# Patient Record
Sex: FEMALE | Race: WHITE | NOT HISPANIC OR LATINO | Employment: OTHER | URBAN - METROPOLITAN AREA
[De-identification: names, ages, dates, MRNs, and addresses within clinical notes are randomized per-mention and may not be internally consistent; named-entity substitution may affect disease eponyms.]

---

## 2022-03-17 ENCOUNTER — TELEPHONE (OUTPATIENT)
Dept: CARDIOLOGY | Facility: CLINIC | Age: 72
End: 2022-03-17
Payer: MEDICARE

## 2022-03-17 NOTE — TELEPHONE ENCOUNTER
----- Message from Carl Flores sent at 3/17/2022 10:43 AM CDT -----  Regarding: Call Back  Who Called: Dr.Pasama Herrera         What is the reason for the call: Dr. Selin herrera is calling in regards to wanting patient to be seen today in regards to a fib. States she was suppose to have surgery today and needed her to follow up with cardiologist. Please contact to further assist.          Can patient be contacted on [employer]: No          Call back number:  [phone]

## 2022-03-18 ENCOUNTER — OFFICE VISIT (OUTPATIENT)
Dept: CARDIOLOGY | Facility: CLINIC | Age: 72
End: 2022-03-18
Payer: MEDICARE

## 2022-03-18 ENCOUNTER — TELEPHONE (OUTPATIENT)
Dept: CARDIOLOGY | Facility: CLINIC | Age: 72
End: 2022-03-18
Payer: MEDICARE

## 2022-03-18 ENCOUNTER — LAB VISIT (OUTPATIENT)
Dept: LAB | Facility: HOSPITAL | Age: 72
End: 2022-03-18
Attending: INTERNAL MEDICINE
Payer: MEDICARE

## 2022-03-18 VITALS
DIASTOLIC BLOOD PRESSURE: 87 MMHG | HEIGHT: 67 IN | HEART RATE: 138 BPM | BODY MASS INDEX: 28.3 KG/M2 | SYSTOLIC BLOOD PRESSURE: 123 MMHG | WEIGHT: 180.31 LBS

## 2022-03-18 DIAGNOSIS — R06.02 SOB (SHORTNESS OF BREATH): ICD-10-CM

## 2022-03-18 DIAGNOSIS — I48.91 ATRIAL FIBRILLATION WITH RVR: Primary | ICD-10-CM

## 2022-03-18 DIAGNOSIS — E66.3 OVERWEIGHT (BMI 25.0-29.9): Chronic | ICD-10-CM

## 2022-03-18 DIAGNOSIS — E78.00 HYPERCHOLESTEROLEMIA: Chronic | ICD-10-CM

## 2022-03-18 DIAGNOSIS — E78.00 HYPERCHOLESTEROLEMIA: ICD-10-CM

## 2022-03-18 DIAGNOSIS — R01.1 UNDIAGNOSED CARDIAC MURMURS: ICD-10-CM

## 2022-03-18 DIAGNOSIS — I48.91 ATRIAL FIBRILLATION WITH RVR: ICD-10-CM

## 2022-03-18 DIAGNOSIS — I48.0 PAROXYSMAL ATRIAL FIBRILLATION: ICD-10-CM

## 2022-03-18 DIAGNOSIS — R53.83 OTHER FATIGUE: Chronic | ICD-10-CM

## 2022-03-18 LAB
CHOLEST SERPL-MCNC: 171 MG/DL (ref 120–199)
CHOLEST/HDLC SERPL: 2.7 {RATIO} (ref 2–5)
HDLC SERPL-MCNC: 63 MG/DL (ref 40–75)
HDLC SERPL: 36.8 % (ref 20–50)
LDLC SERPL CALC-MCNC: 92.6 MG/DL (ref 63–159)
NONHDLC SERPL-MCNC: 108 MG/DL
TRIGL SERPL-MCNC: 77 MG/DL (ref 30–150)
TSH SERPL DL<=0.005 MIU/L-ACNC: 0.83 UIU/ML (ref 0.4–4)

## 2022-03-18 PROCEDURE — 99204 OFFICE O/P NEW MOD 45 MIN: CPT | Mod: S$PBB,,, | Performed by: INTERNAL MEDICINE

## 2022-03-18 PROCEDURE — 84443 ASSAY THYROID STIM HORMONE: CPT | Performed by: INTERNAL MEDICINE

## 2022-03-18 PROCEDURE — 99204 PR OFFICE/OUTPT VISIT, NEW, LEVL IV, 45-59 MIN: ICD-10-PCS | Mod: S$PBB,,, | Performed by: INTERNAL MEDICINE

## 2022-03-18 PROCEDURE — 99213 OFFICE O/P EST LOW 20 MIN: CPT | Mod: PBBFAC,PO | Performed by: INTERNAL MEDICINE

## 2022-03-18 PROCEDURE — 80061 LIPID PANEL: CPT | Performed by: INTERNAL MEDICINE

## 2022-03-18 PROCEDURE — 99999 PR PBB SHADOW E&M-EST. PATIENT-LVL III: ICD-10-PCS | Mod: PBBFAC,,, | Performed by: INTERNAL MEDICINE

## 2022-03-18 PROCEDURE — 36415 COLL VENOUS BLD VENIPUNCTURE: CPT | Mod: PO | Performed by: INTERNAL MEDICINE

## 2022-03-18 PROCEDURE — 99999 PR PBB SHADOW E&M-EST. PATIENT-LVL III: CPT | Mod: PBBFAC,,, | Performed by: INTERNAL MEDICINE

## 2022-03-18 RX ORDER — METOPROLOL SUCCINATE 25 MG/1
25 TABLET, EXTENDED RELEASE ORAL 2 TIMES DAILY
Qty: 60 TABLET | Refills: 2 | Status: SHIPPED | OUTPATIENT
Start: 2022-03-18 | End: 2022-06-15 | Stop reason: DRUGHIGH

## 2022-03-18 RX ORDER — GABAPENTIN 600 MG/1
600 TABLET ORAL 2 TIMES DAILY
COMMUNITY

## 2022-03-18 RX ORDER — ONDANSETRON 4 MG/1
4 TABLET, FILM COATED ORAL EVERY 8 HOURS PRN
COMMUNITY

## 2022-03-18 RX ORDER — TRAMADOL HYDROCHLORIDE 50 MG/1
50 TABLET ORAL EVERY 6 HOURS PRN
COMMUNITY
End: 2022-10-19

## 2022-03-18 RX ORDER — PREDNISONE 5 MG/1
5 TABLET ORAL DAILY PRN
COMMUNITY
End: 2024-02-14 | Stop reason: CLARIF

## 2022-03-18 NOTE — TELEPHONE ENCOUNTER
Cardioversion//tangela 7/14/22 AT 11 AM . Pt vu     Arrive for your procedure at:  [place]      ? FASTING:  You MAY NOT have anything to eat or drink AFTER MIDNIGHT.  If your procedure is scheduled in the afternoon, you may have a LIGHT BREAKFAST BEFORE 6:00 A.M.  For example: Two slices of toast; black coffee or black tea.    ? MEDICATIONS:  You may take your regular morning medications with a small sip of water.     Hold or adjust the following:   Fluid pills.   Diabetes medications.    Continue: Coumadin, Plavix, Effient, Aspirin, Anti-coagulants, Blood thinners    Please refer to pre-op instructions received from [place].

## 2022-03-18 NOTE — PATIENT INSTRUCTIONS
Cardioversion// GEORGE  4/7/22 AT 10 AM     Arrive for your procedure at:  [facility]      FASTING:  You MAY NOT have anything to eat or drink AFTER MIDNIGHT.  If your procedure is scheduled in the afternoon, you may have a LIGHT BREAKFAST BEFORE 6:00 A.M.  For example: Two slices of toast; black coffee or black tea.    MEDICATIONS:  You may take your regular morning medications with a small sip of water.     Hold or adjust the following:  Fluid pills.  Diabetes medications.    Continue: Coumadin, Plavix, Effient, Aspirin, Anti-coagulants, Blood thinners    Please refer to pre-op instructions received from [facility].

## 2022-03-18 NOTE — PROGRESS NOTES
Subjective:    Patient ID:  Veronica Monroy is a 72 y.o. female who presents for Atrial Fibrillation        Palpitations   This is a new problem. The current episode started more than 1 month ago. The problem has been gradually worsening. Associated symptoms include an irregular heartbeat, malaise/fatigue and shortness of breath. Pertinent negatives include no anxiety, chest pain, coughing, diaphoresis, dizziness, fever, nausea, near-syncope, numbness, syncope, vomiting or weakness.   Shortness of Breath  This is a new problem. The current episode started more than 1 month ago. The problem has been gradually worsening. Pertinent negatives include no abdominal pain, chest pain, claudication, fever, hemoptysis, leg swelling, orthopnea, PND, sputum production, syncope, vomiting or wheezing. The symptoms are aggravated by exercise. She has tried rest for the symptoms.       NP EVALUATION, A FIB NOTED ON PRE-OP EKG FOR SHOULDER SURGERY, UNKNOWN ONSET, NOW SINCE SHE KNOWS, HAD INTERMITTENT SX AND SOB, FATIGUE,  PALP, PCP STARTED XARETO DID NOT START YET,HB 11.0, CMP NORMAL,  SX FOR FEW MONTHS, SEE ROS  Past Medical History:   Diagnosis Date    Hyperlipidemia      Past Surgical History:   Procedure Laterality Date    APPENDECTOMY      CATARACT      HYSTERECTOMY       Family History   Problem Relation Age of Onset    Heart disease Mother     Arrhythmia Mother      Social History     Socioeconomic History    Marital status:    Tobacco Use    Smoking status: Former Smoker     Start date: 3/18/2010     Quit date: 3/18/2016     Years since quittin.0    Smokeless tobacco: Never Used   Substance and Sexual Activity    Alcohol use: Not Currently    Drug use: Never    Sexual activity: Not Currently       Review of patient's allergies indicates:  No Known Allergies    Current Outpatient Medications:     gabapentin (NEURONTIN) 600 MG tablet, Take 600 mg by mouth., Disp: , Rfl:     ondansetron (ZOFRAN) 4 MG  tablet, Take 4 mg by mouth every 8 (eight) hours as needed., Disp: , Rfl:     predniSONE (DELTASONE) 5 MG tablet, Take 5 mg by mouth daily as needed., Disp: , Rfl:     traMADoL (ULTRAM) 50 mg tablet, Take 50 mg by mouth every 6 (six) hours as needed., Disp: , Rfl:     metoprolol succinate (TOPROL-XL) 25 MG 24 hr tablet, Take 1 tablet (25 mg total) by mouth 2 (two) times daily., Disp: 60 tablet, Rfl: 2    rivaroxaban (XARELTO) 20 mg Tab, Take 1 tablet (20 mg total) by mouth daily with dinner or evening meal., Disp: 30 tablet, Rfl: 2    Review of Systems   Constitutional: Positive for malaise/fatigue. Negative for chills, diaphoresis, fever and night sweats.   HENT: Negative for congestion and nosebleeds.    Eyes: Negative for blurred vision and visual disturbance.   Cardiovascular: Positive for dyspnea on exertion, irregular heartbeat and palpitations. Negative for chest pain, claudication, cyanosis, leg swelling, near-syncope, orthopnea, paroxysmal nocturnal dyspnea and syncope.   Respiratory: Positive for shortness of breath and sleep disturbances due to breathing. Negative for cough, hemoptysis, sputum production and wheezing.    Endocrine: Negative for cold intolerance, heat intolerance and polyuria.   Hematologic/Lymphatic: Negative for adenopathy. Does not bruise/bleed easily.   Skin: Negative for color change, itching and nail changes.   Musculoskeletal: Negative for back pain (SCIATICA), falls (DECEMBER , TOR ROTATOR CUFF) and joint pain (L SHOULDER).   Gastrointestinal: Negative for bloating, abdominal pain, change in bowel habit, constipation, dysphagia, heartburn, hematemesis, jaundice, melena, nausea and vomiting.   Genitourinary: Negative for dysuria, flank pain, frequency and hematuria.   Neurological: Negative for brief paralysis, dizziness, focal weakness, light-headedness, loss of balance, numbness, paresthesias, seizures, sensory change, tremors and weakness.   Psychiatric/Behavioral: Negative  "for altered mental status, depression, memory loss and substance abuse. The patient is not nervous/anxious.    Allergic/Immunologic: Negative for environmental allergies and persistent infections.        Objective:      Vitals:    03/18/22 0830   BP: 123/87   Pulse: (!) 138   Weight: 81.8 kg (180 lb 5.4 oz)   Height: 5' 6.5" (1.689 m)   PainSc: 0-No pain     Body mass index is 28.67 kg/m².    Physical Exam  Constitutional:       Appearance: She is well-developed.   HENT:      Head: Normocephalic and atraumatic.   Eyes:      Conjunctiva/sclera: Conjunctivae normal.      Pupils: Pupils are equal, round, and reactive to light.   Neck:      Thyroid: No thyromegaly.      Vascular: Normal carotid pulses. No carotid bruit, hepatojugular reflux or JVD.      Trachea: Trachea normal. No tracheal deviation.   Cardiovascular:      Rate and Rhythm: Tachycardia present. Rhythm irregular.  No extrasystoles are present.     Chest Wall: PMI is not displaced.      Pulses:           Carotid pulses are 2+ on the right side and 2+ on the left side.       Radial pulses are 2+ on the right side and 2+ on the left side.        Femoral pulses are 2+ on the right side and 2+ on the left side.       Dorsalis pedis pulses are 2+ on the right side and 2+ on the left side.        Posterior tibial pulses are 2+ on the right side and 2+ on the left side.      Heart sounds: Murmur heard.    Systolic murmur is present with a grade of 2/6 at the lower left sternal border.    No friction rub. No gallop.   Pulmonary:      Effort: Pulmonary effort is normal. No tachypnea or bradypnea.      Breath sounds: Normal breath sounds. No wheezing or rales.   Chest:      Chest wall: No tenderness.   Abdominal:      General: Bowel sounds are normal. There is no distension.      Palpations: Abdomen is soft. There is no mass.      Tenderness: There is no abdominal tenderness. There is no guarding.   Musculoskeletal:         General: Normal range of motion.      " Cervical back: Normal range of motion and neck supple. No edema or erythema.      Right lower leg: No edema.      Left lower leg: No edema.   Lymphadenopathy:      Cervical: No cervical adenopathy.   Skin:     General: Skin is warm and dry.      Coloration: Skin is not pale.      Findings: No erythema or rash.   Neurological:      Mental Status: She is alert and oriented to person, place, and time.      Cranial Nerves: No cranial nerve deficit.      Motor: No tremor or abnormal muscle tone.      Coordination: Coordination normal.   Psychiatric:         Speech: Speech normal.         Behavior: Behavior normal.         Thought Content: Thought content normal.         Judgment: Judgment normal.                 ..    Chemistry    No results found for: NA, K, CL, CO2, BUN, CREATININE, GLU No results found for: CALCIUM, ALKPHOS, AST, ALT, BILITOT, ESTGFRAFRICA, EGFRNONAA         ..  Lab Results   Component Value Date    CHOL 171 03/18/2022     Lab Results   Component Value Date    HDL 63 03/18/2022     Lab Results   Component Value Date    LDLCALC 92.6 03/18/2022     Lab Results   Component Value Date    TRIG 77 03/18/2022     Lab Results   Component Value Date    CHOLHDL 36.8 03/18/2022     ..No results found for: WBC, HGB, HCT, MCV, PLT    Test(s) Reviewed  I have reviewed the following in detail:  [] Stress test   [] Angiography   [] Echocardiogram   [x] Labs     [x] Other:       Assessment:         ICD-10-CM ICD-9-CM   1. Atrial fibrillation with RVR  I48.91 427.31   2. SOB (shortness of breath)  R06.02 786.05   3. Undiagnosed cardiac murmurs  R01.1 785.2   4. Other fatigue  R53.83 780.79   5. Hypercholesterolemia  E78.00 272.0   6. Overweight (BMI 25.0-29.9)  E66.3 278.02     Problem List Items Addressed This Visit        Cardiac/Vascular    Atrial fibrillation with RVR - Primary    Relevant Orders    TSH (Completed)    Echo    Nuclear Stress - Cardiology Interpreted    Undiagnosed cardiac murmurs    Relevant Orders     Echo    Hypercholesterolemia    Relevant Orders    Lipid Panel (Completed)       Endocrine    Overweight (BMI 25.0-29.9)       Other    SOB (shortness of breath)    Relevant Orders    TSH (Completed)    Echo    Nuclear Stress - Cardiology Interpreted    Other fatigue           Plan:     EKG A FIB WITH RVR NS T CHANGES, ONSET PROBABLY IN THE LAST FEW MONTHS NO DEFINITE SYMPTOMS HOWEVER THE PATIENT STATES THAT SHE HAS BEEN TIRED AND SHORT OF BREATH IN THE LAST FEW MONTHS PROBABLY SINCE DECEMBER , ADD XARELTO AND BB, WILL NEED FURTHER ASSESSMENT CHECK ECHO, NUCLEAR STRESS TEST AND, LABS, THEN GEORGE/ CARDIOVERSION, IN FEW WEEKS AFTER BEING ANTICOAGULATED, ASSESS ANGINA EQUIVALENT NO OVERT HEART FAILURE NEW ONSET ARRHYTHMIA, NO TIA TYPE SYMPTOMS NO SYNCOPE, DIET WEIGHT LOSS, RETURN TO CLINIC IN FEW WEEKS.      Atrial fibrillation with RVR  Comments:  NEW PROBABLY IN THE LAST FEW MONTHS  Orders:  -     TSH; Future; Expected date: 03/18/2022  -     Echo  -     Nuclear Stress - Cardiology Interpreted; Future    SOB (shortness of breath)  Comments:  WITH AFIB, EVALUATE FURTHER  Orders:  -     TSH; Future; Expected date: 03/18/2022  -     Echo  -     Nuclear Stress - Cardiology Interpreted; Future    Undiagnosed cardiac murmurs  Comments:  ECHO  Orders:  -     Echo    Other fatigue    Hypercholesterolemia  Comments:  CONSIDER STATIN  Orders:  -     Lipid Panel; Future; Expected date: 03/18/2022    Overweight (BMI 25.0-29.9)  Comments:  DIET    Other orders  -     rivaroxaban (XARELTO) 20 mg Tab; Take 1 tablet (20 mg total) by mouth daily with dinner or evening meal.  Dispense: 30 tablet; Refill: 2  -     metoprolol succinate (TOPROL-XL) 25 MG 24 hr tablet; Take 1 tablet (25 mg total) by mouth 2 (two) times daily.  Dispense: 60 tablet; Refill: 2    RTC Low level/low impact aerobic exercise 5x's/wk. Heart healthy diet and risk factor modification.    See labs and med orders.    Aerobic exercise 5x's/wk. Heart healthy diet and  risk factor modification.    See labs and med orders.

## 2022-03-28 ENCOUNTER — HOSPITAL ENCOUNTER (OUTPATIENT)
Dept: RADIOLOGY | Facility: HOSPITAL | Age: 72
Discharge: HOME | End: 2022-03-28
Attending: INTERNAL MEDICINE
Payer: MEDICARE

## 2022-03-28 ENCOUNTER — CLINICAL SUPPORT (OUTPATIENT)
Dept: CARDIOLOGY | Facility: HOSPITAL | Age: 72
End: 2022-03-28
Attending: INTERNAL MEDICINE
Payer: MEDICARE

## 2022-03-28 VITALS — WEIGHT: 180 LBS | BODY MASS INDEX: 28.93 KG/M2 | HEIGHT: 66 IN

## 2022-03-28 DIAGNOSIS — R06.02 SOB (SHORTNESS OF BREATH): ICD-10-CM

## 2022-03-28 DIAGNOSIS — I48.91 ATRIAL FIBRILLATION WITH RVR: ICD-10-CM

## 2022-03-28 LAB
CV PHARM DOSE: 0.4 MG
CV STRESS BASE HR: 114 BPM
DIASTOLIC BLOOD PRESSURE: 75 MMHG
NUC REST EJECTION FRACTION: 33
NUC STRESS EJECTION FRACTION: 36 %
OHS CV CPX 1 MINUTE RECOVERY HEART RATE: 116 BPM
OHS CV CPX 85 PERCENT MAX PREDICTED HEART RATE MALE: 123
OHS CV CPX MAX PREDICTED HEART RATE: 144
OHS CV CPX PATIENT IS FEMALE: 1
OHS CV CPX PATIENT IS MALE: 0
OHS CV CPX PEAK DIASTOLIC BLOOD PRESSURE: 75 MMHG
OHS CV CPX PEAK HEAR RATE: 160 BPM
OHS CV CPX PEAK RATE PRESSURE PRODUCT: NORMAL
OHS CV CPX PEAK SYSTOLIC BLOOD PRESSURE: 113 MMHG
OHS CV CPX PERCENT MAX PREDICTED HEART RATE ACHIEVED: 111
OHS CV CPX RATE PRESSURE PRODUCT PRESENTING: NORMAL
OHS CV PHARM TIME: 1411 MIN
SYSTOLIC BLOOD PRESSURE: 113 MMHG

## 2022-03-28 PROCEDURE — 93016 CV STRESS TEST SUPVJ ONLY: CPT | Mod: ,,, | Performed by: INTERNAL MEDICINE

## 2022-03-28 PROCEDURE — 78452 STRESS TEST WITH MYOCARDIAL PERFUSION (CUPID ONLY): ICD-10-PCS | Mod: 26,,, | Performed by: INTERNAL MEDICINE

## 2022-03-28 PROCEDURE — 93017 CV STRESS TEST TRACING ONLY: CPT | Mod: PO

## 2022-03-28 PROCEDURE — A9502 TC99M TETROFOSMIN: HCPCS | Mod: PO

## 2022-03-28 PROCEDURE — 78452 HT MUSCLE IMAGE SPECT MULT: CPT | Mod: 26,,, | Performed by: INTERNAL MEDICINE

## 2022-03-28 PROCEDURE — 93018 CV STRESS TEST I&R ONLY: CPT | Mod: ,,, | Performed by: INTERNAL MEDICINE

## 2022-03-28 PROCEDURE — 93016 STRESS TEST WITH MYOCARDIAL PERFUSION (CUPID ONLY): ICD-10-PCS | Mod: ,,, | Performed by: INTERNAL MEDICINE

## 2022-03-28 PROCEDURE — 63600175 PHARM REV CODE 636 W HCPCS: Mod: PO | Performed by: INTERNAL MEDICINE

## 2022-03-28 PROCEDURE — 93018 PR CARDIAC STRESS TST,INTERP/REPT ONLY: ICD-10-PCS | Mod: ,,, | Performed by: INTERNAL MEDICINE

## 2022-03-28 RX ORDER — AMINOPHYLLINE 25 MG/ML
75 INJECTION, SOLUTION INTRAVENOUS
Status: COMPLETED | OUTPATIENT
Start: 2022-03-28 | End: 2022-03-28

## 2022-03-28 RX ORDER — REGADENOSON 0.08 MG/ML
0.4 INJECTION, SOLUTION INTRAVENOUS ONCE
Status: COMPLETED | OUTPATIENT
Start: 2022-03-28 | End: 2022-03-28

## 2022-03-28 RX ADMIN — REGADENOSON 0.4 MG: 0.08 INJECTION, SOLUTION INTRAVENOUS at 02:03

## 2022-03-28 RX ADMIN — AMINOPHYLLINE 75 MG: 25 INJECTION, SOLUTION INTRAVENOUS at 02:03

## 2022-04-06 ENCOUNTER — CLINICAL SUPPORT (OUTPATIENT)
Dept: CARDIOLOGY | Facility: HOSPITAL | Age: 72
End: 2022-04-06
Attending: INTERNAL MEDICINE
Payer: MEDICARE

## 2022-04-06 VITALS — HEART RATE: 88 BPM | HEIGHT: 66 IN | BODY MASS INDEX: 28.93 KG/M2 | WEIGHT: 180 LBS

## 2022-04-06 PROCEDURE — 93306 TTE W/DOPPLER COMPLETE: CPT | Mod: PO

## 2022-04-06 PROCEDURE — 93306 ECHO (CUPID ONLY): ICD-10-PCS | Mod: 26,,, | Performed by: INTERNAL MEDICINE

## 2022-04-06 PROCEDURE — 93306 TTE W/DOPPLER COMPLETE: CPT | Mod: 26,,, | Performed by: INTERNAL MEDICINE

## 2022-04-07 LAB
ASCENDING AORTA: 2.1 CM
AV INDEX (PROSTH): 0.69
AV MEAN GRADIENT: 4 MMHG
AV PEAK GRADIENT: 7 MMHG
AV VALVE AREA: 2.1 CM2
AV VELOCITY RATIO: 0.75
BSA FOR ECHO PROCEDURE: 1.95 M2
CV ECHO LV RWT: 0.26 CM
DOP CALC AO PEAK VEL: 1.3 M/S
DOP CALC AO VTI: 26.61 CM
DOP CALC LVOT AREA: 3 CM2
DOP CALC LVOT DIAMETER: 1.97 CM
DOP CALC LVOT PEAK VEL: 0.97 M/S
DOP CALC LVOT STROKE VOLUME: 55.84 CM3
DOP CALCLVOT PEAK VEL VTI: 18.33 CM
ECHO LV POSTERIOR WALL: 0.77 CM (ref 0.6–1.1)
EJECTION FRACTION: 40 %
FRACTIONAL SHORTENING: 23 % (ref 28–44)
INTERVENTRICULAR SEPTUM: 1.02 CM (ref 0.6–1.1)
IVRT: 154.14 MSEC
LA MAJOR: 6.86 CM
LA MINOR: 6.85 CM
LA WIDTH: 6.01 CM
LEFT ATRIUM SIZE: 4.67 CM
LEFT ATRIUM VOLUME INDEX: 85.6 ML/M2
LEFT ATRIUM VOLUME: 163.54 CM3
LEFT INTERNAL DIMENSION IN SYSTOLE: 4.51 CM (ref 2.1–4)
LEFT VENTRICLE DIASTOLIC VOLUME INDEX: 88.97 ML/M2
LEFT VENTRICLE DIASTOLIC VOLUME: 169.94 ML
LEFT VENTRICLE MASS INDEX: 107 G/M2
LEFT VENTRICLE SYSTOLIC VOLUME INDEX: 48.7 ML/M2
LEFT VENTRICLE SYSTOLIC VOLUME: 93.01 ML
LEFT VENTRICULAR INTERNAL DIMENSION IN DIASTOLE: 5.85 CM (ref 3.5–6)
LEFT VENTRICULAR MASS: 205.06 G
PISA MRMAX VEL: 0.05 M/S
PISA TR MAX VEL: 3.16 M/S
RA MAJOR: 6.16 CM
RA PRESSURE: 8 MMHG
RA WIDTH: 3.43 CM
RIGHT VENTRICULAR END-DIASTOLIC DIMENSION: 3.53 CM
RV TISSUE DOPPLER FREE WALL SYSTOLIC VELOCITY 1 (APICAL 4 CHAMBER VIEW): 20.01 CM/S
SINUS: 2.87 CM
STJ: 2.27 CM
TR MAX PG: 40 MMHG
TRICUSPID ANNULAR PLANE SYSTOLIC EXCURSION: 2.11 CM
TV REST PULMONARY ARTERY PRESSURE: 48 MMHG

## 2022-04-08 ENCOUNTER — TELEPHONE (OUTPATIENT)
Dept: CARDIOLOGY | Facility: CLINIC | Age: 72
End: 2022-04-08
Payer: MEDICARE

## 2022-04-08 DIAGNOSIS — R94.39 ABNORMAL STRESS TEST: Primary | ICD-10-CM

## 2022-04-11 ENCOUNTER — TELEPHONE (OUTPATIENT)
Dept: CARDIOLOGY | Facility: CLINIC | Age: 72
End: 2022-04-11
Payer: MEDICARE

## 2022-04-11 NOTE — TELEPHONE ENCOUNTER
----- Message from Fouzia Fitzgerald sent at 4/11/2022  8:31 AM CDT -----  Regarding: pt called  Name of Who is Calling: KINGA MIRANDA [[id number]]      What is the request in detail:Pt would like to speak with the Nurse to confirm information about her upcoming appt and procedure. Please advise        Can the clinic reply by [facility]: No      What [facility] to [facility] Back [facility]: [phone]

## 2022-04-11 NOTE — TELEPHONE ENCOUNTER
Spoke to pt over the phone , pt wanted to confirm procedure on 4/14/22 Alexandre w cardioversion. Pt VU

## 2022-04-12 ENCOUNTER — TELEPHONE (OUTPATIENT)
Dept: CARDIOLOGY | Facility: CLINIC | Age: 72
End: 2022-04-12
Payer: MEDICARE

## 2022-04-12 NOTE — TELEPHONE ENCOUNTER
----- Message from Raquel Kumar sent at 4/12/2022 10:25 AM CDT -----  Contact: Pt  Type: Needs Medical Advice    Who Called:  Pt    Medication: rivaroxaban (XARELTO) 20 mg Tab    Best Call Back Number: [phone]    Additional Information: Pt states she is going to get above Rx from [facility], but it won't get to her for 4-5 days.  She wants to know if it's OK to go without it for that long, or if a few pills can be prescribed.  Please call back.  Thanks.

## 2022-04-12 NOTE — TELEPHONE ENCOUNTER
----- Message from Aparna Arteaga sent at 4/12/2022 10:47 AM CDT -----  Contact: patient  Type: Needs Medical Advice  Who Called:  patient   Medication: rivaroxaban (XARELTO) 20 mg Tab  Pharmacy name and phone #:    [id number] - [address] [address]  Phone: [phone] Fax: [phone]  [facility] Call Back Number:[phone] (home)   Additional Information: patient only has one pill left and her meds wont be there until Friday or Saturday- she wants to know if there is something she can take for the next could of days until it comes in or can you send her something (an alternative that is cost effective) to the pharmacy for a few days.

## 2022-04-19 DIAGNOSIS — R94.39 ABNORMAL STRESS TEST: Primary | ICD-10-CM

## 2022-04-21 ENCOUNTER — TELEPHONE (OUTPATIENT)
Dept: CARDIOLOGY | Facility: CLINIC | Age: 72
End: 2022-04-21
Payer: MEDICARE

## 2022-04-21 NOTE — TELEPHONE ENCOUNTER
----- Message from Yon Palma MD sent at 4/8/2022  3:41 PM CDT -----  Abnormal stress test cardiac catheterization 4 weeks after her scheduled cardioversion

## 2022-04-21 NOTE — TELEPHONE ENCOUNTER
Angiogram  5/16/22 at 8 am   Arrive for procedure at: [facility]    You will receive a phone call from [facility] Pre-Op Department with further instructions prior to your scheduled procedure.    Notify the nurse if you are ALLERGIC TO IODINE.    FASTING: You MAY NOT have anything to eat or drink AFTER MIDNIGHT the day before your procedure. If your procedure is scheduled in the afternoon, you may have a LIGHT BREAKFAST 6-8 hours prior to your procedure.  For example: Two slices of toast; black coffee or black tea.    MEDICATIONS: You may take your regular morning medications with water. If there are any medications that you should not take, you will be instructed to hold them for that morning.    ? CARDIOLOGY PRE-PROCEDURE MEDICATION ORDERS:  ** Please hold any medications that are checked below:    HOLD   # OF DAYS TO HOLD  ? Coumadin   Consult with Coumadin Clinic   ? Xarelto    _DAY BEFORE & DAY OF_  ? Pradaxa  _ DAY BEFORE & DAY OF _  ? Eliquis   _ DAY BEFORE & DAY OF _  ? Metformin    Day before procedure & morning of procedure  ? Short acting insulin   Morning of procedure    CONTINUE the Following Medications   ? Plavix      ? Effient     ? Aspirin    WHAT TO EXPECT:    How long will the procedure take?  The procedure will take an average of 1 - 2 hours to perform.  After the procedure, you will need to lay flat for around 4 - 6 hours to minimize bleeding from the puncture site. If the wrist is accessed you will need to keep your arm still as instructed by the nurse.    When can I go home?  You may be able to be discharged home that same afternoon if there were no complications.  If you have one of the following: balloon; stent; pacemaker or defibrillator procedures, you may spend one night for observation.  Your doctor will determine your discharge based upon your progress.  The results of your procedure will be discussed with you before you are discharged.  Any further testing or procedures  will be scheduled for you either before you leave or you will be instructed to call for a future appointment.      TRANSPORTATION:  PLEASE ARRANGE TO HAVE SOMEONE DRIVE YOU HOME FOLLOWING YOUR PROCEDURE, YOU WILL NOT BE ALLOWED TO DRIVE.

## 2022-04-21 NOTE — TELEPHONE ENCOUNTER
----- Message from Delmis Walsh sent at 4/21/2022 12:18 PM CDT -----  Contact: self  Type:  Patient Returning Call    Who Called:  patient  Who Left Message for Patient:  Roberto  Does the patient know what this is regarding?:  [facility]   Best Call Back Number:  [phone] (home)   Additional Information:  Thanks she will be waiting for your call

## 2022-04-27 ENCOUNTER — OFFICE VISIT (OUTPATIENT)
Dept: CARDIOLOGY | Facility: CLINIC | Age: 72
End: 2022-04-27
Payer: MEDICARE

## 2022-04-27 VITALS
OXYGEN SATURATION: 98 % | HEIGHT: 66 IN | HEART RATE: 75 BPM | WEIGHT: 175.69 LBS | DIASTOLIC BLOOD PRESSURE: 58 MMHG | SYSTOLIC BLOOD PRESSURE: 123 MMHG | BODY MASS INDEX: 28.23 KG/M2

## 2022-04-27 DIAGNOSIS — I42.0 DILATED CARDIOMYOPATHY: ICD-10-CM

## 2022-04-27 DIAGNOSIS — Z79.01 LONG TERM (CURRENT) USE OF ANTICOAGULANTS: Chronic | ICD-10-CM

## 2022-04-27 DIAGNOSIS — I34.0 NONRHEUMATIC MITRAL VALVE REGURGITATION: ICD-10-CM

## 2022-04-27 DIAGNOSIS — E66.3 OVERWEIGHT (BMI 25.0-29.9): Chronic | ICD-10-CM

## 2022-04-27 DIAGNOSIS — R06.02 SOB (SHORTNESS OF BREATH): ICD-10-CM

## 2022-04-27 DIAGNOSIS — R94.39 ABNORMAL NUCLEAR STRESS TEST: Primary | ICD-10-CM

## 2022-04-27 DIAGNOSIS — I48.0 PAF (PAROXYSMAL ATRIAL FIBRILLATION): Chronic | ICD-10-CM

## 2022-04-27 PROCEDURE — 99214 PR OFFICE/OUTPT VISIT, EST, LEVL IV, 30-39 MIN: ICD-10-PCS | Mod: S$GLB,,, | Performed by: INTERNAL MEDICINE

## 2022-04-27 PROCEDURE — 99214 OFFICE O/P EST MOD 30 MIN: CPT | Mod: S$GLB,,, | Performed by: INTERNAL MEDICINE

## 2022-04-27 RX ORDER — SACUBITRIL AND VALSARTAN 24; 26 MG/1; MG/1
1 TABLET, FILM COATED ORAL 2 TIMES DAILY
Qty: 60 TABLET | Refills: 2 | Status: SHIPPED | OUTPATIENT
Start: 2022-04-27 | End: 2023-06-21 | Stop reason: SDUPTHER

## 2022-04-27 NOTE — PATIENT INSTRUCTIONS
Angiogram  5/16/22 at 11 am / arrive 9 am   Arrive for procedure at: [facility]    You will receive a phone call from [facility] Pre-Op Department with further instructions prior to your scheduled procedure.    Notify the nurse if you are ALLERGIC TO IODINE.    FASTING: You MAY NOT have anything to eat or drink AFTER MIDNIGHT the day before your procedure. If your procedure is scheduled in the afternoon, you may have a LIGHT BREAKFAST 6-8 hours prior to your procedure.  For example: Two slices of toast; black coffee or black tea.    MEDICATIONS: You may take your regular morning medications with water. If there are any medications that you should not take, you will be instructed to hold them for that morning.    CARDIOLOGY PRE-PROCEDURE MEDICATION ORDERS:  ** Please hold any medications that are checked below:    HOLD   # OF DAYS TO HOLD  Coumadin   Consult with Coumadin Clinic   Xarelto    _DAY BEFORE & DAY OF_  Pradaxa  _ DAY BEFORE & DAY OF _  Eliquis   _ DAY BEFORE & DAY OF _  Metformin    Day before procedure & morning of procedure  Short acting insulin   Morning of procedure    CONTINUE the Following Medications   Plavix      Effient     Aspirin    WHAT TO EXPECT:    How long will the procedure take?  The procedure will take an average of 1 - 2 hours to perform.  After the procedure, you will need to lay flat for around 4 - 6 hours to minimize bleeding from the puncture site. If the wrist is accessed you will need to keep your arm still as instructed by the nurse.    When can I go home?  You may be able to be discharged home that same afternoon if there were no complications.  If you have one of the following: balloon; stent; pacemaker or defibrillator procedures, you may spend one night for observation.  Your doctor will determine your discharge based upon your progress.  The results of your procedure will be discussed with you before you are discharged.  Any further testing or procedures will  be scheduled for you either before you leave or you will be instructed to call for a future appointment.      TRANSPORTATION:  PLEASE ARRANGE TO HAVE SOMEONE DRIVE YOU HOME FOLLOWING YOUR PROCEDURE, YOU WILL NOT BE ALLOWED TO DRIVE.

## 2022-04-27 NOTE — PROGRESS NOTES
Subjective:    Patient ID:  Veronica Monroy is a 72 y.o. female who presents for Shortness of Breath and Atrial Fibrillation        HPI  MULTIPLE TEST ABNORMAL NUCLEAR STRESS TEST WITH EF 33-36%, AND THEN GEORGE WITH AT LEAST MODERATE MR WITH SUCCESSFUL CARDIOVERSION TO NORMAL SINUS RHYTHM, EF DECREASE ON THE STRESS TEST  , SCIATICA AND SHOULDER PAIN, RECENT BRONCHITIS  ON ABX , WAS GIVEN INHALER, , SEE ROS  Past Medical History:   Diagnosis Date    A-fib     Anticoagulant long-term use     Arthritis     Hyperlipidemia      Past Surgical History:   Procedure Laterality Date    APPENDECTOMY      CATARACT Bilateral     HYSTERECTOMY      HYSTERECTOMY       Family History   Problem Relation Age of Onset    Heart disease Mother     Arrhythmia Mother      Social History     Socioeconomic History    Marital status:    Tobacco Use    Smoking status: Former Smoker     Start date: 3/18/2010     Quit date: 3/18/2016     Years since quittin.1    Smokeless tobacco: Never Used   Substance and Sexual Activity    Alcohol use: Not Currently    Drug use: Never    Sexual activity: Not Currently       Review of patient's allergies indicates:  No Known Allergies    Current Outpatient Medications:     gabapentin (NEURONTIN) 600 MG tablet, Take 600 mg by mouth 2 (two) times daily., Disp: , Rfl:     HYDROcodone-acetaminophen (NORCO) 7.5-325 mg per tablet, Take 1 tablet by mouth every 6 (six) hours as needed for Pain., Disp: , Rfl:     metoprolol succinate (TOPROL-XL) 25 MG 24 hr tablet, Take 1 tablet (25 mg total) by mouth 2 (two) times daily., Disp: 60 tablet, Rfl: 2    ondansetron (ZOFRAN) 4 MG tablet, Take 4 mg by mouth every 8 (eight) hours as needed., Disp: , Rfl:     predniSONE (DELTASONE) 5 MG tablet, Take 5 mg by mouth daily as needed., Disp: , Rfl:     rivaroxaban (XARELTO) 20 mg Tab, Take 1 tablet (20 mg total) by mouth daily with dinner or evening meal., Disp: 6 tablet, Rfl: 0    sacubitriL-valsartan  "(ENTRESTO) 24-26 mg per tablet, Take 1 tablet by mouth 2 (two) times daily., Disp: 60 tablet, Rfl: 2    traMADoL (ULTRAM) 50 mg tablet, Take 50 mg by mouth every 6 (six) hours as needed., Disp: , Rfl:     Review of Systems   Constitutional: Positive for malaise/fatigue. Negative for chills, diaphoresis, fever and night sweats.   HENT: Negative for congestion and nosebleeds.    Eyes: Negative for blurred vision and visual disturbance.   Cardiovascular: Positive for dyspnea on exertion. Negative for chest pain, claudication, cyanosis, irregular heartbeat, leg swelling, near-syncope, orthopnea, palpitations, paroxysmal nocturnal dyspnea and syncope.   Respiratory: Positive for shortness of breath. Negative for cough, hemoptysis, sleep disturbances due to breathing and wheezing.    Endocrine: Negative.    Hematologic/Lymphatic: Negative for adenopathy. Does not bruise/bleed easily.   Skin: Negative for color change, itching and poor wound healing.   Musculoskeletal: Negative for back pain (SCIATICA), falls and joint pain (L SHOULDER).   Gastrointestinal: Negative for abdominal pain, change in bowel habit, dysphagia, hematemesis, hematochezia, jaundice, melena, nausea and vomiting.   Genitourinary: Negative for dysuria, flank pain and hematuria.   Neurological: Negative for brief paralysis, headaches, light-headedness, loss of balance, numbness, paresthesias and seizures.   Psychiatric/Behavioral: Negative for altered mental status, depression and memory loss. The patient is not nervous/anxious.    Allergic/Immunologic: Negative.         Objective:      Vitals:    04/27/22 1417   BP: (!) 123/58   Pulse: 75   SpO2: 98%   Weight: 79.7 kg (175 lb 11.3 oz)   Height: 5' 6" (1.676 m)   PainSc: 0-No pain     Body mass index is 28.36 kg/m².    Physical Exam  Constitutional:       Appearance: She is well-developed.   HENT:      Head: Normocephalic and atraumatic.   Eyes:      General: No scleral icterus.     Extraocular " Movements: Extraocular movements intact.      Conjunctiva/sclera: Conjunctivae normal.   Neck:      Thyroid: No thyromegaly.      Vascular: Normal carotid pulses. No carotid bruit, hepatojugular reflux or JVD.      Trachea: Trachea normal. No tracheal deviation.   Cardiovascular:      Rate and Rhythm: Normal rate and regular rhythm.  No extrasystoles are present.     Pulses:           Carotid pulses are 2+ on the right side and 2+ on the left side.       Radial pulses are 2+ on the right side and 2+ on the left side.        Posterior tibial pulses are 2+ on the right side and 2+ on the left side.      Heart sounds: Murmur heard.    Holosystolic murmur is present with a grade of 2/6 at the lower left sternal border and apex.    No friction rub. No gallop.   Pulmonary:      Effort: Pulmonary effort is normal.      Breath sounds: Rhonchi present. No rales.   Abdominal:      General: Bowel sounds are normal.      Palpations: Abdomen is soft.      Tenderness: There is no abdominal tenderness.   Musculoskeletal:         General: Normal range of motion.      Cervical back: Normal range of motion and neck supple. No edema or erythema.      Right lower leg: No edema.      Left lower leg: No edema.   Lymphadenopathy:      Cervical: No cervical adenopathy.   Skin:     General: Skin is warm and dry.      Capillary Refill: Capillary refill takes less than 2 seconds.      Coloration: Skin is not pale.      Findings: No erythema or rash.   Neurological:      General: No focal deficit present.      Mental Status: She is alert and oriented to person, place, and time.      Cranial Nerves: No cranial nerve deficit.      Motor: No tremor or abnormal muscle tone.      Coordination: Coordination normal.   Psychiatric:         Mood and Affect: Mood normal.         Speech: Speech normal.         Behavior: Behavior normal.                 ..    Chemistry    No results found for: NA, K, CL, CO2, BUN, CREATININE, GLU No results found for:  CALCIUM, ALKPHOS, AST, ALT, BILITOT, ESTGFRAFRICA, EGFRNONAA         ..  Lab Results   Component Value Date    CHOL 171 03/18/2022     Lab Results   Component Value Date    HDL 63 03/18/2022     Lab Results   Component Value Date    LDLCALC 92.6 03/18/2022     Lab Results   Component Value Date    TRIG 77 03/18/2022     Lab Results   Component Value Date    CHOLHDL 36.8 03/18/2022     ..No results found for: WBC, HGB, HCT, MCV, PLT    Test(s) Reviewed  I have reviewed the following in detail:  [x] Stress test   [] Angiography   [x] Echocardiogram   [x] Labs   [] Other:       Assessment:         ICD-10-CM ICD-9-CM   1. Abnormal nuclear stress test  R94.39 794.39   2. Dilated cardiomyopathy  I42.0 425.4   3. Nonrheumatic mitral valve regurgitation  I34.0 424.0   4. PAF (paroxysmal atrial fibrillation)  I48.0 427.31   5. Long term (current) use of anticoagulants  Z79.01 V58.61   6. Overweight (BMI 25.0-29.9)  E66.3 278.02     Problem List Items Addressed This Visit        Cardiac/Vascular    Abnormal nuclear stress test - Primary    Dilated cardiomyopathy    Nonrheumatic mitral valve regurgitation    PAF (paroxysmal atrial fibrillation)       Hematology    Long term (current) use of anticoagulants       Endocrine    Overweight (BMI 25.0-29.9)           Plan:         ADD ENTRESTO THEN LHC IN 2-3 WEEKS, HOLD XARELTO 2 DAYS BEFORE,  DAILY WEIGHT EXPLAINED 2 LB PER DAY 5 LB PER WEEK RULE, STATUS POST CARDIOVERSION NOW IN SINUS RHYTHM, ASSESS ANGINA EQUIVALENT NO TIA TYPE SYMPTOMS NO NEAR-SYNCOPE, DIET EXERCISE WEIGHT LOSS, RETURN TO CLINIC IN FEW WEEKS AFTER TEST.  Abnormal nuclear stress test  Comments:    LEFT HEART CATHETERIZATION    Dilated cardiomyopathy  Comments:   CATHETERIZATION ASSESS FOR ISCHEMIA VERSUS TACHYCARDIA INDUCED CARDIOMYOPATHY    Nonrheumatic mitral valve regurgitation  Comments:   MODERATE    PAF (paroxysmal atrial fibrillation)  Comments:   NOW SINUS RHYTHM    Long term (current) use of  anticoagulants  Comments:   CONTINUE    Overweight (BMI 25.0-29.9)  Comments:   WEIGHT LOSS    Other orders  -     sacubitriL-valsartan (ENTRESTO) 24-26 mg per tablet; Take 1 tablet by mouth 2 (two) times daily.  Dispense: 60 tablet; Refill: 2    RTC Low level/low impact aerobic exercise 5x's/wk. Heart healthy diet and risk factor modification.    See labs and med orders.    Aerobic exercise 5x's/wk. Heart healthy diet and risk factor modification.    See labs and med orders.         Detail Level: Zone Initiate Treatment: Eucerin, Aveeno, cleansers and moisturizers daily.

## 2022-05-03 ENCOUNTER — TELEPHONE (OUTPATIENT)
Dept: CARDIOLOGY | Facility: CLINIC | Age: 72
End: 2022-05-03
Payer: MEDICARE

## 2022-05-03 ENCOUNTER — TELEPHONE (OUTPATIENT)
Dept: PHARMACY | Facility: AMBULARY SURGERY CENTER | Age: 72
End: 2022-05-03
Payer: MEDICARE

## 2022-05-03 NOTE — TELEPHONE ENCOUNTER
----- Message from Julisa Almanza sent at 5/3/2022  9:47 AM CDT -----  Regarding: returning call  Contact: patient  Type:  Patient Returning Call    Who Called:  patient  Who Left Message for Patient:  Roberto  Does the patient know what this is regarding?:  Xeralto medication  Best Call Back Number:  [phone] (home)   Additional Information:  please call patient. Thanks!

## 2022-05-03 NOTE — TELEPHONE ENCOUNTER
----- Message from Aparna Arteaga sent at 5/3/2022  8:44 AM CDT -----  Contact: patient  Type: Needs Medical Advice  Who Called:  patient  Pharmacy name and phone #:    [facility]  PH#[phone], fax#[phone]  Best Call Back Number: [phone] (home)   Additional Information: patient is requesting a call back regarding some confusion with her rivaroxaban (XARELTO) 20 mg Tab. Please advise.

## 2022-06-15 ENCOUNTER — OFFICE VISIT (OUTPATIENT)
Dept: CARDIOLOGY | Facility: CLINIC | Age: 72
End: 2022-06-15
Payer: MEDICARE

## 2022-06-15 VITALS
DIASTOLIC BLOOD PRESSURE: 54 MMHG | BODY MASS INDEX: 29.13 KG/M2 | SYSTOLIC BLOOD PRESSURE: 114 MMHG | HEART RATE: 73 BPM | WEIGHT: 185.63 LBS | HEIGHT: 67 IN

## 2022-06-15 DIAGNOSIS — E66.3 OVERWEIGHT (BMI 25.0-29.9): Chronic | ICD-10-CM

## 2022-06-15 DIAGNOSIS — I50.1 LEFT HEART FAILURE: Primary | Chronic | ICD-10-CM

## 2022-06-15 DIAGNOSIS — Z79.01 LONG TERM (CURRENT) USE OF ANTICOAGULANTS: Chronic | ICD-10-CM

## 2022-06-15 DIAGNOSIS — I48.0 PAF (PAROXYSMAL ATRIAL FIBRILLATION): ICD-10-CM

## 2022-06-15 DIAGNOSIS — I34.0 NONRHEUMATIC MITRAL VALVE REGURGITATION: Chronic | ICD-10-CM

## 2022-06-15 PROCEDURE — 99214 PR OFFICE/OUTPT VISIT, EST, LEVL IV, 30-39 MIN: ICD-10-PCS | Mod: S$GLB,,, | Performed by: INTERNAL MEDICINE

## 2022-06-15 PROCEDURE — 99214 OFFICE O/P EST MOD 30 MIN: CPT | Mod: S$GLB,,, | Performed by: INTERNAL MEDICINE

## 2022-06-15 RX ORDER — METOPROLOL SUCCINATE 25 MG/1
25 TABLET, EXTENDED RELEASE ORAL DAILY
Qty: 90 TABLET | Refills: 0 | Status: SHIPPED | OUTPATIENT
Start: 2022-06-15 | End: 2022-09-14 | Stop reason: SDUPTHER

## 2022-06-15 NOTE — PROGRESS NOTES
Subjective:    Patient ID:  Veronica Monroy is a 72 y.o. female who presents for angiogram follow up, Congestive Heart Failure, and Atrial Fibrillation        HPI    CATH NORMAL CORONARY AA, EF 40%, CMP AST 57,AND HAD CARDIOVERSION,  BREATHING BETTER, SOME FATIGUE, RARE PALP,BP ON LOW END, WAS TAKING ENTRESTO 1 AT NIGHT, ALSO TAKES TOROL 1 1/2,  SEE ROS  Past Medical History:   Diagnosis Date    A-fib     Anticoagulant long-term use     Arthritis     Hyperlipidemia      Past Surgical History:   Procedure Laterality Date    APPENDECTOMY      CATARACT Bilateral     CORONARY ANGIOGRAPHY N/A 2022    Procedure: ANGIOGRAM, CORONARY ARTERY;  Surgeon: Yon Palma MD;  Location: [facility] CATH;  Service: Cardiology;  Laterality: N/A;    HYSTERECTOMY      HYSTERECTOMY      LEFT HEART CATHETERIZATION Left 2022    Procedure: Left heart cath;  Surgeon: Yon Palma MD;  Location: [facility] CATH;  Service: Cardiology;  Laterality: Left;     Family History   Problem Relation Age of Onset    Heart disease Mother     Arrhythmia Mother      Social History     Socioeconomic History    Marital status:    Tobacco Use    Smoking status: Former Smoker     Start date: 3/18/2010     Quit date: 3/18/2016     Years since quittin.2    Smokeless tobacco: Never Used   Substance and Sexual Activity    Alcohol use: Not Currently    Drug use: Yes     Types: Marijuana     Comment: cbd oil    Sexual activity: Not Currently       Review of patient's allergies indicates:  No Known Allergies    Current Outpatient Medications:     gabapentin (NEURONTIN) 600 MG tablet, Take 600 mg by mouth 2 (two) times daily. prn, Disp: , Rfl:     HYDROcodone-acetaminophen (NORCO) 7.5-325 mg per tablet, Take 1 tablet by mouth every 6 (six) hours as needed for Pain., Disp: , Rfl:     ondansetron (ZOFRAN) 4 MG tablet, Take 4 mg by mouth every 8 (eight) hours as needed., Disp: , Rfl:     predniSONE (DELTASONE) 5 MG tablet, Take 5 mg by  "mouth daily as needed., Disp: , Rfl:     rivaroxaban (XARELTO) 20 mg Tab, Take 1 tablet (20 mg total) by mouth daily with dinner or evening meal., Disp: 90 tablet, Rfl: 0    sacubitriL-valsartan (ENTRESTO) 24-26 mg per tablet, Take 1 tablet by mouth 2 (two) times daily. (Patient taking differently: Take 1 tablet by mouth every evening.), Disp: 60 tablet, Rfl: 2    metoprolol succinate (TOPROL-XL) 25 MG 24 hr tablet, Take 1 tablet (25 mg total) by mouth once daily., Disp: 90 tablet, Rfl: 0    traMADoL (ULTRAM) 50 mg tablet, Take 50 mg by mouth every 6 (six) hours as needed., Disp: , Rfl:     Review of Systems   Constitutional: Positive for weight gain. Negative for chills, diaphoresis, fever, malaise/fatigue and night sweats.   HENT: Negative for congestion and nosebleeds.    Eyes: Negative.    Cardiovascular: Negative for chest pain, claudication, cyanosis, dyspnea on exertion (MILD), irregular heartbeat, leg swelling, near-syncope, orthopnea, palpitations, paroxysmal nocturnal dyspnea and syncope.   Respiratory: Negative for cough, hemoptysis, shortness of breath and wheezing.    Endocrine: Negative.    Hematologic/Lymphatic: Negative for adenopathy. Does not bruise/bleed easily.   Skin: Negative for color change and itching.   Musculoskeletal: Negative for falls and joint pain (L SHOULDER,BETTER).   Gastrointestinal: Negative for abdominal pain, change in bowel habit, dysphagia, jaundice, melena, nausea and vomiting.   Genitourinary: Negative for dysuria, flank pain and hematuria.   Neurological: Negative for brief paralysis, headaches, light-headedness, loss of balance, numbness and paresthesias.   Psychiatric/Behavioral: Negative for altered mental status, depression and memory loss.   Allergic/Immunologic: Negative.         Objective:      Vitals:    06/15/22 1450   BP: (!) 114/54   Pulse: 73   Weight: 84.2 kg (185 lb 10 oz)   Height: 5' 6.5" (1.689 m)   PainSc: 0-No pain     Body mass index is 29.51 " kg/m².    Physical Exam  Constitutional:       Appearance: She is well-developed.   HENT:      Head: Normocephalic and atraumatic.   Eyes:      General: No scleral icterus.     Extraocular Movements: Extraocular movements intact.      Pupils: Pupils are equal, round, and reactive to light.   Neck:      Thyroid: No thyromegaly.      Vascular: Normal carotid pulses. No carotid bruit, hepatojugular reflux or JVD.      Trachea: Trachea normal. No tracheal deviation.   Cardiovascular:      Rate and Rhythm: Normal rate and regular rhythm.  No extrasystoles are present.     Pulses:           Carotid pulses are 2+ on the right side and 2+ on the left side.       Radial pulses are 2+ on the right side and 2+ on the left side.        Posterior tibial pulses are 2+ on the right side and 2+ on the left side.      Heart sounds: Murmur heard.    Systolic murmur is present with a grade of 2/6 at the lower left sternal border.    No friction rub. No gallop.   Pulmonary:      Effort: Pulmonary effort is normal. No respiratory distress.      Breath sounds: No rales.   Abdominal:      General: Bowel sounds are normal.      Palpations: Abdomen is soft.      Tenderness: There is no abdominal tenderness.   Musculoskeletal:         General: Normal range of motion.      Cervical back: Neck supple. No edema or erythema.      Right lower leg: No edema.      Left lower leg: No edema.   Skin:     General: Skin is warm and dry.      Capillary Refill: Capillary refill takes less than 2 seconds.      Coloration: Skin is not pale.      Findings: No erythema or rash.   Neurological:      General: No focal deficit present.      Mental Status: She is alert and oriented to person, place, and time.      Cranial Nerves: No cranial nerve deficit.      Motor: No tremor or abnormal muscle tone.      Coordination: Coordination normal.   Psychiatric:         Mood and Affect: Mood normal.         Speech: Speech normal.         Behavior: Behavior normal.                  ..    Chemistry        Component Value Date/Time     05/16/2022 0916    K 4.7 05/16/2022 0916     05/16/2022 0916    CO2 31 05/16/2022 0916    BUN 21 (H) 05/16/2022 0916    CREATININE 0.84 05/16/2022 0916    GLU 95 05/16/2022 0916        Component Value Date/Time    CALCIUM 8.4 05/16/2022 0916    ALKPHOS 40 05/16/2022 0916    AST 57 (H) 05/16/2022 0916    ALT 33 05/16/2022 0916    BILITOT 0.6 05/16/2022 0916    ESTGFRAFRICA >60 05/16/2022 0916    EGFRNONAA >60 05/16/2022 0916            ..  Lab Results   Component Value Date    CHOL 171 03/18/2022     Lab Results   Component Value Date    HDL 63 03/18/2022     Lab Results   Component Value Date    LDLCALC 92.6 03/18/2022     Lab Results   Component Value Date    TRIG 77 03/18/2022     Lab Results   Component Value Date    CHOLHDL 36.8 03/18/2022     ..  Lab Results   Component Value Date    WBC 4.77 05/16/2022    HGB 9.6 (L) 05/16/2022    HCT 29.1 (L) 05/16/2022     (H) 05/16/2022     05/16/2022       Test(s) Reviewed  I have reviewed the following in detail:  [] Stress test   [x] Angiography   [] Echocardiogram   [x] Labs   [] Other:       Assessment:         ICD-10-CM ICD-9-CM   1. Left heart failure  I50.1 428.1   2. Nonrheumatic mitral valve regurgitation  I34.0 424.0   3. PAF (paroxysmal atrial fibrillation)  I48.0 427.31   4. Overweight (BMI 25.0-29.9)  E66.3 278.02   5. Long term (current) use of anticoagulants  Z79.01 V58.61     Problem List Items Addressed This Visit        Cardiac/Vascular    Nonrheumatic mitral valve regurgitation    PAF (paroxysmal atrial fibrillation)    Left heart failure - Primary    Relevant Orders    Comprehensive Metabolic Panel       Hematology    Long term (current) use of anticoagulants    Relevant Orders    Comprehensive Metabolic Panel    Hemoglobin       Endocrine    Overweight (BMI 25.0-29.9)           Plan:     CHANGE ENTRESTO TO 1/2 AM, AND ONE AT NIGHT, WATCH BLOOD PRESSURE ,  DECREASE TOPROL TO 25 MG DAILY, DAILY WEIGHT, 2 LB PER DAY 5 LB PER WEEK RULE, NO TRUE ANGINA CLASS 2 HEART FAILURE STABLE ARRHYTHMIA POST CARDIOVERSION DIET EXERCISE WEIGHT LOSS, RETURN TO CLINIC IN 4 MO WITH LABS      Left heart failure  Comments:  CLINICALLY IMPROVED CLASS 2  Orders:  -     Comprehensive Metabolic Panel; Future; Expected date: 09/15/2022    Nonrheumatic mitral valve regurgitation    PAF (paroxysmal atrial fibrillation)  Comments:  MONITOR    Overweight (BMI 25.0-29.9)    Long term (current) use of anticoagulants  -     Comprehensive Metabolic Panel; Future; Expected date: 09/15/2022  -     Hemoglobin; Future; Expected date: 09/15/2022    Other orders  -     metoprolol succinate (TOPROL-XL) 25 MG 24 hr tablet; Take 1 tablet (25 mg total) by mouth once daily.  Dispense: 90 tablet; Refill: 0    RTC Low level/low impact aerobic exercise 5x's/wk. Heart healthy diet and risk factor modification.    See labs and med orders.    Aerobic exercise 5x's/wk. Heart healthy diet and risk factor modification.    See labs and med orders.

## 2022-07-20 ENCOUNTER — TELEPHONE (OUTPATIENT)
Dept: CARDIOLOGY | Facility: CLINIC | Age: 72
End: 2022-07-20
Payer: MEDICARE

## 2022-07-20 NOTE — TELEPHONE ENCOUNTER
Pt called states that she has been feeling very fatigued lately.     7/15  84/53 P 67  7/16 93/50 P 57  7/17 96/61 P 61  7/19 90/51 P 64  7/20 100/59 P 65    Please advise.

## 2022-09-14 DIAGNOSIS — I48.3 TYPICAL ATRIAL FLUTTER: ICD-10-CM

## 2022-09-14 DIAGNOSIS — I48.0 PAROXYSMAL ATRIAL FIBRILLATION: Primary | ICD-10-CM

## 2022-09-14 DIAGNOSIS — I50.31 ACUTE DIASTOLIC HEART FAILURE: ICD-10-CM

## 2022-09-14 RX ORDER — METOPROLOL SUCCINATE 25 MG/1
25 TABLET, EXTENDED RELEASE ORAL DAILY
Qty: 90 TABLET | Refills: 0 | Status: SHIPPED | OUTPATIENT
Start: 2022-09-14 | End: 2022-12-23

## 2022-10-13 ENCOUNTER — LAB VISIT (OUTPATIENT)
Dept: LAB | Facility: CLINIC | Age: 72
End: 2022-10-13
Payer: MEDICARE

## 2022-10-13 DIAGNOSIS — I50.1 LEFT HEART FAILURE: Chronic | ICD-10-CM

## 2022-10-13 DIAGNOSIS — Z79.01 LONG TERM (CURRENT) USE OF ANTICOAGULANTS: Chronic | ICD-10-CM

## 2022-10-13 LAB
ALBUMIN SERPL BCP-MCNC: 3.6 G/DL (ref 3.5–5.2)
ALP SERPL-CCNC: 56 U/L (ref 55–135)
ALT SERPL W/O P-5'-P-CCNC: 13 U/L (ref 10–44)
ANION GAP SERPL CALC-SCNC: 8 MMOL/L (ref 8–16)
AST SERPL-CCNC: 15 U/L (ref 10–40)
BILIRUB SERPL-MCNC: 0.5 MG/DL (ref 0.1–1)
BUN SERPL-MCNC: 17 MG/DL (ref 8–23)
CALCIUM SERPL-MCNC: 8.8 MG/DL (ref 8.7–10.5)
CHLORIDE SERPL-SCNC: 107 MMOL/L (ref 95–110)
CO2 SERPL-SCNC: 26 MMOL/L (ref 23–29)
CREAT SERPL-MCNC: 1.2 MG/DL (ref 0.5–1.4)
EST. GFR  (NO RACE VARIABLE): 48.7 ML/MIN/1.73 M^2
GLUCOSE SERPL-MCNC: 104 MG/DL (ref 70–110)
HGB BLD-MCNC: 10.7 G/DL (ref 12–16)
POTASSIUM SERPL-SCNC: 4.5 MMOL/L (ref 3.5–5.1)
PROT SERPL-MCNC: 6.3 G/DL (ref 6–8.4)
SODIUM SERPL-SCNC: 141 MMOL/L (ref 136–145)

## 2022-10-13 PROCEDURE — 85018 HEMOGLOBIN: CPT | Performed by: INTERNAL MEDICINE

## 2022-10-13 PROCEDURE — 36415 PR COLLECTION VENOUS BLOOD,VENIPUNCTURE: ICD-10-PCS | Mod: ,,, | Performed by: STUDENT IN AN ORGANIZED HEALTH CARE EDUCATION/TRAINING PROGRAM

## 2022-10-13 PROCEDURE — 36415 COLL VENOUS BLD VENIPUNCTURE: CPT | Mod: ,,, | Performed by: STUDENT IN AN ORGANIZED HEALTH CARE EDUCATION/TRAINING PROGRAM

## 2022-10-13 PROCEDURE — 80053 COMPREHEN METABOLIC PANEL: CPT | Performed by: INTERNAL MEDICINE

## 2022-10-18 NOTE — PROGRESS NOTES
Subjective:    Patient ID:  Veronica Monroy is a 72 y.o. female who presents for Follow-up, Atrial Fibrillation, and Congestive Heart Failure        HPI    CMP WITH GFR OF 48, HEMOGLOBIN 10.7 UP, DOING BETTER, WAS ON DIET PILLS BEFORE HEART TROUBLES, SEE ROS  Past Medical History:   Diagnosis Date    A-fib     Anticoagulant long-term use     Arthritis     Hyperlipidemia      Past Surgical History:   Procedure Laterality Date    APPENDECTOMY      CATARACT Bilateral     CORONARY ANGIOGRAPHY N/A 2022    Procedure: ANGIOGRAM, CORONARY ARTERY;  Surgeon: Yon Palma MD;  Location: [facility];  Service: Cardiology;  Laterality: N/A;    HYSTERECTOMY      HYSTERECTOMY      LEFT HEART CATHETERIZATION Left 2022    Procedure: Left heart cath;  Surgeon: Yon Palma MD;  Location: [facility];  Service: Cardiology;  Laterality: Left;     Family History   Problem Relation Age of Onset    Heart disease Mother     Arrhythmia Mother      Social History     Socioeconomic History    Marital status:    Tobacco Use    Smoking status: Former     Types: Cigarettes     Start date: 3/18/2010     Quit date: 3/18/2016     Years since quittin.5    Smokeless tobacco: Never   Substance and Sexual Activity    Alcohol use: Not Currently    Drug use: Yes     Types: Marijuana     Comment: cbd oil    Sexual activity: Not Currently       Review of patient's allergies indicates:  No Known Allergies    Current Outpatient Medications:     gabapentin (NEURONTIN) 600 MG tablet, Take 600 mg by mouth 2 (two) times daily. prn, Disp: , Rfl:     HYDROcodone-acetaminophen (NORCO) 7.5-325 mg per tablet, Take 1 tablet by mouth every 6 (six) hours as needed for Pain., Disp: , Rfl:     metoprolol succinate (TOPROL-XL) 25 MG 24 hr tablet, Take 1 tablet (25 mg total) by mouth once daily., Disp: 90 tablet, Rfl: 0    ondansetron (ZOFRAN) 4 MG tablet, Take 4 mg by mouth every 8 (eight) hours as needed., Disp: , Rfl:     predniSONE (DELTASONE) 5 MG  "tablet, Take 5 mg by mouth daily as needed., Disp: , Rfl:     sacubitriL-valsartan (ENTRESTO) 24-26 mg per tablet, Take 1 tablet by mouth 2 (two) times daily. (Patient taking differently: Take 1 tablet by mouth every evening.), Disp: 60 tablet, Rfl: 2    XARELTO 20 mg Tab, TAKE 1 TABLET BY MOUTH EVERY DAY WITH DINNER OR EVENING MEAL, Disp: 90 tablet, Rfl: 0    Review of Systems   Constitutional: Positive for malaise/fatigue and weight gain. Negative for chills, diaphoresis, fever and night sweats.   HENT:  Negative for congestion and nosebleeds.    Eyes:  Negative for blurred vision and visual disturbance.   Cardiovascular:  Negative for chest pain, claudication, cyanosis, dyspnea on exertion (MILD), irregular heartbeat, leg swelling, near-syncope, orthopnea, palpitations, paroxysmal nocturnal dyspnea and syncope.   Respiratory:  Positive for snoring. Negative for cough, hemoptysis, shortness of breath and wheezing.    Endocrine: Negative.    Hematologic/Lymphatic: Negative for adenopathy. Does not bruise/bleed easily.   Skin:  Negative for color change and itching.   Musculoskeletal:  Negative for falls and joint pain (L SHOULDER,BETTER).   Gastrointestinal:  Negative for abdominal pain, change in bowel habit, dysphagia, jaundice, melena, nausea and vomiting.   Genitourinary:  Negative for dysuria and flank pain.   Neurological:  Negative for brief paralysis, headaches, light-headedness, loss of balance and paresthesias.   Psychiatric/Behavioral:  Negative for altered mental status and depression.    Allergic/Immunologic: Negative for hives and persistent infections.      Objective:      Vitals:    10/19/22 0954   BP: (!) 116/57   Pulse: 63   Weight: 91.8 kg (202 lb 6.1 oz)   Height: 5' 6" (1.676 m)   PainSc: 0-No pain     Body mass index is 32.67 kg/m².    Physical Exam  Constitutional:       Appearance: She is well-developed. She is obese.   HENT:      Head: Normocephalic and atraumatic.   Eyes:      Extraocular " Movements: Extraocular movements intact.      Conjunctiva/sclera: Conjunctivae normal.   Neck:      Thyroid: No thyromegaly.      Vascular: Normal carotid pulses. No carotid bruit, hepatojugular reflux or JVD.      Trachea: Trachea normal. No tracheal deviation.   Cardiovascular:      Rate and Rhythm: Normal rate and regular rhythm.      Pulses:           Carotid pulses are 2+ on the right side and 2+ on the left side.       Radial pulses are 2+ on the right side and 2+ on the left side.        Posterior tibial pulses are 2+ on the right side and 2+ on the left side.      Heart sounds: Murmur heard.   Systolic murmur is present with a grade of 2/6 at the lower left sternal border and apex.     No friction rub. No gallop. No S4 sounds.   Pulmonary:      Effort: Pulmonary effort is normal.      Breath sounds: No rales.   Abdominal:      General: Bowel sounds are normal.      Palpations: Abdomen is soft.      Tenderness: There is no abdominal tenderness.   Musculoskeletal:         General: Normal range of motion.      Cervical back: Neck supple. No edema or erythema.      Right lower leg: No edema.      Left lower leg: No edema.   Skin:     General: Skin is warm and dry.      Capillary Refill: Capillary refill takes less than 2 seconds.   Neurological:      General: No focal deficit present.      Mental Status: She is alert and oriented to person, place, and time.      Cranial Nerves: No cranial nerve deficit.      Motor: No tremor or abnormal muscle tone.   Psychiatric:         Attention and Perception: Attention normal.         Mood and Affect: Mood normal.         Speech: Speech normal.         Behavior: Behavior normal.               ..    Chemistry        Component Value Date/Time     10/13/2022 0851    K 4.5 10/13/2022 0851     10/13/2022 0851    CO2 26 10/13/2022 0851    BUN 17 10/13/2022 0851    CREATININE 1.2 10/13/2022 0851     10/13/2022 0851        Component Value Date/Time    CALCIUM 8.8  10/13/2022 0851    ALKPHOS 56 10/13/2022 0851    AST 15 10/13/2022 0851    ALT 13 10/13/2022 0851    BILITOT 0.5 10/13/2022 0851    ESTGFRAFRICA >60 05/16/2022 0916    EGFRNONAA >60 05/16/2022 0916            ..  Lab Results   Component Value Date    CHOL 171 03/18/2022     Lab Results   Component Value Date    HDL 63 03/18/2022     Lab Results   Component Value Date    LDLCALC 92.6 03/18/2022     Lab Results   Component Value Date    TRIG 77 03/18/2022     Lab Results   Component Value Date    CHOLHDL 36.8 03/18/2022     ..  Lab Results   Component Value Date    WBC 4.77 05/16/2022    HGB 10.7 (L) 10/13/2022    HCT 29.1 (L) 05/16/2022     (H) 05/16/2022     05/16/2022       Test(s) Reviewed  I have reviewed the following in detail:  [] Stress test   [] Angiography   [] Echocardiogram   [x] Labs   [] Other:       Assessment:         ICD-10-CM ICD-9-CM   1. Left heart failure  I50.1 428.1   2. Stage 3a chronic kidney disease  N18.31 585.3   3. Other sleep apnea  G47.39 327.29   4. Nonrheumatic mitral valve regurgitation  I34.0 424.0   5. Obesity, Class I, BMI 30-34.9  E66.9 278.00   6. PAF (paroxysmal atrial fibrillation)  I48.0 427.31   7. Long term (current) use of anticoagulants  Z79.01 V58.61     Problem List Items Addressed This Visit          Cardiac/Vascular    Nonrheumatic mitral valve regurgitation    Relevant Orders    Echo    PAF (paroxysmal atrial fibrillation)    Left heart failure - Primary    Relevant Orders    Comprehensive Metabolic Panel    Echo       Renal/    Stage 3a chronic kidney disease    Relevant Orders    Comprehensive Metabolic Panel    Hemoglobin       Hematology    Long term (current) use of anticoagulants    Relevant Orders    Comprehensive Metabolic Panel    Hemoglobin       Endocrine    Obesity, Class I, BMI 30-34.9       Other    Other sleep apnea    Relevant Orders    Home Sleep Studies        Plan:         SLEEP STUDY, DAILY WEIGHT, WEIGHT LOSS, NO ANGINA CLASS 2  HEART FAILURE STABLE ARRHYTHMIA DIET EXERCISE WEIGHT LOSS, CONTINUE CURRENT MEDICATION RETURN TO CLINIC IN 4 MO WITH ECHO REASSESS EF AND MR, AND LABS, INCREASED CV RISK WITH CKD  Left heart failure  Comments:  REASSESS EF  Orders:  -     Comprehensive Metabolic Panel; Future; Expected date: 01/19/2023  -     Echo    Stage 3a chronic kidney disease  -     Comprehensive Metabolic Panel; Future; Expected date: 01/19/2023  -     Hemoglobin; Future; Expected date: 01/19/2023    Other sleep apnea  Comments:  SLEEP STUDY  Orders:  -     Home Sleep Studies; Future    Nonrheumatic mitral valve regurgitation  -     Echo    Obesity, Class I, BMI 30-34.9  Comments:  WEIGHT LOSS    PAF (paroxysmal atrial fibrillation)    Long term (current) use of anticoagulants  -     Comprehensive Metabolic Panel; Future; Expected date: 01/19/2023  -     Hemoglobin; Future; Expected date: 01/19/2023  RTC Low level/low impact aerobic exercise 5x's/wk. Heart healthy diet and risk factor modification.    See labs and med orders.    Aerobic exercise 5x's/wk. Heart healthy diet and risk factor modification.    See labs and med orders.

## 2022-10-19 ENCOUNTER — OFFICE VISIT (OUTPATIENT)
Dept: CARDIOLOGY | Facility: CLINIC | Age: 72
End: 2022-10-19
Payer: MEDICARE

## 2022-10-19 VITALS
DIASTOLIC BLOOD PRESSURE: 57 MMHG | SYSTOLIC BLOOD PRESSURE: 116 MMHG | WEIGHT: 202.38 LBS | BODY MASS INDEX: 32.53 KG/M2 | HEIGHT: 66 IN | HEART RATE: 63 BPM

## 2022-10-19 DIAGNOSIS — Z79.01 LONG TERM (CURRENT) USE OF ANTICOAGULANTS: Chronic | ICD-10-CM

## 2022-10-19 DIAGNOSIS — I34.0 NONRHEUMATIC MITRAL VALVE REGURGITATION: Chronic | ICD-10-CM

## 2022-10-19 DIAGNOSIS — E66.9 OBESITY, CLASS I, BMI 30-34.9: Chronic | ICD-10-CM

## 2022-10-19 DIAGNOSIS — I48.0 PAF (PAROXYSMAL ATRIAL FIBRILLATION): Chronic | ICD-10-CM

## 2022-10-19 DIAGNOSIS — N18.31 STAGE 3A CHRONIC KIDNEY DISEASE: Chronic | ICD-10-CM

## 2022-10-19 DIAGNOSIS — I50.1 LEFT HEART FAILURE: Primary | Chronic | ICD-10-CM

## 2022-10-19 DIAGNOSIS — G47.39 OTHER SLEEP APNEA: ICD-10-CM

## 2022-10-19 PROBLEM — R01.1 UNDIAGNOSED CARDIAC MURMURS: Status: RESOLVED | Noted: 2022-03-18 | Resolved: 2022-10-19

## 2022-10-19 PROBLEM — E66.811 OBESITY, CLASS I, BMI 30-34.9: Status: ACTIVE | Noted: 2022-03-18

## 2022-10-19 PROCEDURE — 99214 PR OFFICE/OUTPT VISIT, EST, LEVL IV, 30-39 MIN: ICD-10-PCS | Mod: S$GLB,,, | Performed by: INTERNAL MEDICINE

## 2022-10-19 PROCEDURE — 99214 OFFICE O/P EST MOD 30 MIN: CPT | Mod: S$GLB,,, | Performed by: INTERNAL MEDICINE

## 2023-01-11 DIAGNOSIS — I48.3 TYPICAL ATRIAL FLUTTER: ICD-10-CM

## 2023-01-11 DIAGNOSIS — I48.0 PAROXYSMAL ATRIAL FIBRILLATION: Primary | ICD-10-CM

## 2023-01-11 DIAGNOSIS — I49.02 VENTRICULAR FLUTTER: ICD-10-CM

## 2023-01-25 ENCOUNTER — OFFICE VISIT (OUTPATIENT)
Dept: CARDIOLOGY | Facility: CLINIC | Age: 73
End: 2023-01-25
Payer: MEDICARE

## 2023-01-25 VITALS
WEIGHT: 201.75 LBS | HEART RATE: 67 BPM | BODY MASS INDEX: 32.42 KG/M2 | HEIGHT: 66 IN | DIASTOLIC BLOOD PRESSURE: 60 MMHG | SYSTOLIC BLOOD PRESSURE: 124 MMHG

## 2023-01-25 DIAGNOSIS — G47.39 OTHER SLEEP APNEA: ICD-10-CM

## 2023-01-25 DIAGNOSIS — I48.0 PAF (PAROXYSMAL ATRIAL FIBRILLATION): Primary | Chronic | ICD-10-CM

## 2023-01-25 DIAGNOSIS — Z79.01 LONG TERM (CURRENT) USE OF ANTICOAGULANTS: Chronic | ICD-10-CM

## 2023-01-25 DIAGNOSIS — I34.0 NONRHEUMATIC MITRAL VALVE REGURGITATION: Chronic | ICD-10-CM

## 2023-01-25 DIAGNOSIS — E66.9 OBESITY, CLASS I, BMI 30-34.9: Chronic | ICD-10-CM

## 2023-01-25 DIAGNOSIS — I50.1 LEFT HEART FAILURE: Chronic | ICD-10-CM

## 2023-01-25 PROCEDURE — 99214 PR OFFICE/OUTPT VISIT, EST, LEVL IV, 30-39 MIN: ICD-10-PCS | Mod: S$GLB,,, | Performed by: INTERNAL MEDICINE

## 2023-01-25 PROCEDURE — 99214 OFFICE O/P EST MOD 30 MIN: CPT | Mod: S$GLB,,, | Performed by: INTERNAL MEDICINE

## 2023-01-25 RX ORDER — WARFARIN SODIUM 5 MG/1
5 TABLET ORAL DAILY
Qty: 30 TABLET | Refills: 3 | Status: SHIPPED | OUTPATIENT
Start: 2023-01-25 | End: 2023-02-20

## 2023-01-25 NOTE — PROGRESS NOTES
Subjective:    Patient ID:  Veronica Monroy is a 72 y.o. female who presents for Atrial Fibrillation and Congestive Heart Failure        Atrial Fibrillation  Symptoms are negative for chest pain, palpitations, shortness of breath and syncope. Past medical history includes atrial fibrillation and CHF.   Congestive Heart Failure  Pertinent negatives include no abdominal pain, chest pain, claudication, near-syncope, palpitations or shortness of breath.     REQUESTED OV, XARLETO PROGRAM ENDED CANNOT AFFORD, WANTS WARFARIN, NEEDS SHOULDER SURGERY, SEE ROS  Past Medical History:   Diagnosis Date    A-fib     Anticoagulant long-term use     Arthritis     Hyperlipidemia      Past Surgical History:   Procedure Laterality Date    APPENDECTOMY      CATARACT Bilateral     CORONARY ANGIOGRAPHY N/A 2022    Procedure: ANGIOGRAM, CORONARY ARTERY;  Surgeon: Yon Palma MD;  Location: [facility];  Service: Cardiology;  Laterality: N/A;    HYSTERECTOMY      HYSTERECTOMY      LEFT HEART CATHETERIZATION Left 2022    Procedure: Left heart cath;  Surgeon: Yon Palma MD;  Location: [facility];  Service: Cardiology;  Laterality: Left;     Family History   Problem Relation Age of Onset    Heart disease Mother     Arrhythmia Mother      Social History     Socioeconomic History    Marital status:    Tobacco Use    Smoking status: Former     Types: Cigarettes     Start date: 3/18/2010     Quit date: 3/18/2016     Years since quittin.8    Smokeless tobacco: Never   Substance and Sexual Activity    Alcohol use: Not Currently    Drug use: Yes     Types: Marijuana     Comment: cbd oil    Sexual activity: Not Currently       Review of patient's allergies indicates:  No Known Allergies    Current Outpatient Medications:     gabapentin (NEURONTIN) 600 MG tablet, Take 600 mg by mouth 2 (two) times daily. prn, Disp: , Rfl:     HYDROcodone-acetaminophen (NORCO) 7.5-325 mg per tablet, Take 1 tablet by mouth every 6 (six) hours as  "needed for Pain., Disp: , Rfl:     metoprolol succinate (TOPROL-XL) 25 MG 24 hr tablet, Take 1 tablet by mouth once daily, Disp: 90 tablet, Rfl: 1    ondansetron (ZOFRAN) 4 MG tablet, Take 4 mg by mouth every 8 (eight) hours as needed., Disp: , Rfl:     predniSONE (DELTASONE) 5 MG tablet, Take 5 mg by mouth daily as needed., Disp: , Rfl:     sacubitriL-valsartan (ENTRESTO) 24-26 mg per tablet, Take 1 tablet by mouth 2 (two) times daily. (Patient taking differently: Take 1 tablet by mouth every evening.), Disp: 60 tablet, Rfl: 2    warfarin (COUMADIN) 5 MG tablet, Take 1 tablet (5 mg total) by mouth Daily., Disp: 30 tablet, Rfl: 3    Review of Systems   Constitutional: Negative for chills, diaphoresis, fever, malaise/fatigue and night sweats. Weight loss: SOME.  HENT:  Negative for congestion and nosebleeds.    Eyes:  Negative for blurred vision and visual disturbance.   Cardiovascular:  Negative for chest pain, claudication, cyanosis, dyspnea on exertion (MILD), irregular heartbeat, leg swelling, near-syncope, orthopnea, palpitations, paroxysmal nocturnal dyspnea and syncope.   Respiratory:  Negative for cough, hemoptysis, shortness of breath and wheezing.    Endocrine: Negative.    Hematologic/Lymphatic: Negative for adenopathy. Does not bruise/bleed easily.   Skin:  Negative for color change and itching.   Musculoskeletal:  Negative for falls and joint pain (L SHOULDER,BETTER).   Gastrointestinal:  Negative for abdominal pain, change in bowel habit, dysphagia, jaundice, melena, nausea and vomiting.   Genitourinary:  Negative for dysuria, flank pain and hematuria.   Neurological:  Negative for brief paralysis, headaches, light-headedness, loss of balance, numbness and paresthesias.   Psychiatric/Behavioral:  Negative for altered mental status and depression.       Objective:      Vitals:    01/25/23 1322   BP: 124/60   Pulse: 67   Weight: 91.5 kg (201 lb 11.5 oz)   Height: 5' 6" (1.676 m)   PainSc: 0-No pain "     Body mass index is 32.56 kg/m².    Physical Exam  Constitutional:       Appearance: She is obese.   HENT:      Head: Normocephalic and atraumatic.   Eyes:      Extraocular Movements: Extraocular movements intact.      Conjunctiva/sclera: Conjunctivae normal.   Neck:      Vascular: No carotid bruit.   Cardiovascular:      Rate and Rhythm: Normal rate and regular rhythm.      Heart sounds: Murmur heard.     No friction rub. No gallop.   Pulmonary:      Effort: Pulmonary effort is normal.      Breath sounds: Normal breath sounds. No rales.   Abdominal:      Palpations: Abdomen is soft.      Tenderness: There is no abdominal tenderness.   Musculoskeletal:      Cervical back: Neck supple.      Right lower leg: No edema.      Left lower leg: No edema.   Skin:     General: Skin is warm and dry.      Capillary Refill: Capillary refill takes less than 2 seconds.   Neurological:      General: No focal deficit present.      Mental Status: She is alert and oriented to person, place, and time.   Psychiatric:         Mood and Affect: Mood normal.         Behavior: Behavior normal.             ..    Chemistry        Component Value Date/Time     10/13/2022 0851    K 4.5 10/13/2022 0851     10/13/2022 0851    CO2 26 10/13/2022 0851    BUN 17 10/13/2022 0851    CREATININE 1.2 10/13/2022 0851     10/13/2022 0851        Component Value Date/Time    CALCIUM 8.8 10/13/2022 0851    ALKPHOS 56 10/13/2022 0851    AST 15 10/13/2022 0851    ALT 13 10/13/2022 0851    BILITOT 0.5 10/13/2022 0851    ESTGFRAFRICA >60 05/16/2022 0916    EGFRNONAA >60 05/16/2022 0916            ..  Lab Results   Component Value Date    CHOL 171 03/18/2022     Lab Results   Component Value Date    HDL 63 03/18/2022     Lab Results   Component Value Date    LDLCALC 92.6 03/18/2022     Lab Results   Component Value Date    TRIG 77 03/18/2022     Lab Results   Component Value Date    CHOLHDL 36.8 03/18/2022     ..  Lab Results   Component Value  Date    WBC 4.77 05/16/2022    HGB 10.7 (L) 10/13/2022    HCT 29.1 (L) 05/16/2022     (H) 05/16/2022     05/16/2022       Test(s) Reviewed  I have reviewed the following in detail:  [] Stress test   [] Angiography   [] Echocardiogram   [] Labs   [] Other:       Assessment:         ICD-10-CM ICD-9-CM   1. PAF (paroxysmal atrial fibrillation)  I48.0 427.31   2. Left heart failure  I50.1 428.1   3. Long term (current) use of anticoagulants  Z79.01 V58.61   4. Nonrheumatic mitral valve regurgitation  I34.0 424.0   5. Obesity, Class I, BMI 30-34.9  E66.9 278.00   6. Other sleep apnea  G47.39 327.29     Problem List Items Addressed This Visit          Cardiac/Vascular    Nonrheumatic mitral valve regurgitation    PAF (paroxysmal atrial fibrillation) - Primary    Relevant Orders    Ambulatory referral/consult to Anticoagulation Monitoring (PHARMACIST MANAGED)    Comprehensive Metabolic Panel    Left heart failure    Relevant Orders    Comprehensive Metabolic Panel       Hematology    Long term (current) use of anticoagulants    Relevant Orders    Ambulatory referral/consult to Anticoagulation Monitoring (PHARMACIST MANAGED)    Comprehensive Metabolic Panel    Hemoglobin       Endocrine    Obesity, Class I, BMI 30-34.9       Other    Other sleep apnea    Relevant Orders    Home Sleep Study        Plan:     CHANGE XARLETO TO WARFARIN, EDUCATION COUMADIN CLINIC, JESSE STUDY, [place] FOR SHOULDER SURGERY, ACCEPTABLE CV RISK, STABLE ARRHYTHMIA NO ANGINA CLASS 2 HEART FAILURE DIET EXERCISE WEIGHT LOSS, RETURN TO CLINIC IN 4 MO      PAF (paroxysmal atrial fibrillation)  -     Ambulatory referral/consult to Anticoagulation Monitoring (PHARMACIST MANAGED); Future; Expected date: 02/01/2023  -     Comprehensive Metabolic Panel; Future; Expected date: 04/25/2023    Left heart failure  Comments:  IMPROVED  Orders:  -     Comprehensive Metabolic Panel; Future; Expected date: 04/25/2023    Long term (current) use of  anticoagulants  Comments:  CHANGE MEDS  Orders:  -     Ambulatory referral/consult to Anticoagulation Monitoring (PHARMACIST MANAGED); Future; Expected date: 02/01/2023  -     Comprehensive Metabolic Panel; Future; Expected date: 04/25/2023  -     Hemoglobin; Future; Expected date: 04/25/2023    Nonrheumatic mitral valve regurgitation    Obesity, Class I, BMI 30-34.9    Other sleep apnea  -     Home Sleep Study; Future    Other orders  -     warfarin (COUMADIN) 5 MG tablet; Take 1 tablet (5 mg total) by mouth Daily.  Dispense: 30 tablet; Refill: 3    RTC Low level/low impact aerobic exercise 5x's/wk. Heart healthy diet and risk factor modification.    See labs and med orders.    Aerobic exercise 5x's/wk. Heart healthy diet and risk factor modification.    See labs and med orders.

## 2023-02-08 ENCOUNTER — TELEPHONE (OUTPATIENT)
Dept: PHARMACY | Facility: CLINIC | Age: 73
End: 2023-02-08
Payer: MEDICARE

## 2023-02-08 ENCOUNTER — PATIENT MESSAGE (OUTPATIENT)
Dept: PHARMACY | Facility: CLINIC | Age: 73
End: 2023-02-08
Payer: MEDICARE

## 2023-02-08 NOTE — TELEPHONE ENCOUNTER
I have reached out to Veronica Monroy to inform her of the [employer] application process for Entresto and whats required to apply.  Veronica Monroy did not answer. I left a voicemail and mailed a letter introducing her to the pharmacy patient assistance program. I will follow up in 5 business days.

## 2023-02-16 NOTE — TELEPHONE ENCOUNTER
A 2nd attempt has been made to establish contact with Veronica Monroy  via LETTER. The final contact attempt will be made in 5 business days

## 2023-05-04 ENCOUNTER — LAB VISIT (OUTPATIENT)
Dept: PRIMARY CARE CLINIC | Facility: CLINIC | Age: 73
End: 2023-05-04
Payer: MEDICARE

## 2023-05-04 DIAGNOSIS — I50.1 LEFT HEART FAILURE: Chronic | ICD-10-CM

## 2023-05-04 DIAGNOSIS — Z79.01 LONG TERM (CURRENT) USE OF ANTICOAGULANTS: Chronic | ICD-10-CM

## 2023-05-04 DIAGNOSIS — I48.0 PAF (PAROXYSMAL ATRIAL FIBRILLATION): Chronic | ICD-10-CM

## 2023-05-04 DIAGNOSIS — Z79.01 LONG TERM (CURRENT) USE OF ANTICOAGULANTS: ICD-10-CM

## 2023-05-04 LAB
ALBUMIN SERPL BCP-MCNC: 3.8 G/DL (ref 3.5–5.2)
ALP SERPL-CCNC: 66 U/L (ref 55–135)
ALT SERPL W/O P-5'-P-CCNC: 11 U/L (ref 10–44)
ANION GAP SERPL CALC-SCNC: 11 MMOL/L (ref 8–16)
AST SERPL-CCNC: 14 U/L (ref 10–40)
BILIRUB SERPL-MCNC: 0.5 MG/DL (ref 0.1–1)
BUN SERPL-MCNC: 18 MG/DL (ref 8–23)
CALCIUM SERPL-MCNC: 9.3 MG/DL (ref 8.7–10.5)
CHLORIDE SERPL-SCNC: 107 MMOL/L (ref 95–110)
CO2 SERPL-SCNC: 25 MMOL/L (ref 23–29)
CREAT SERPL-MCNC: 1.1 MG/DL (ref 0.5–1.4)
EST. GFR  (NO RACE VARIABLE): 53.7 ML/MIN/1.73 M^2
GLUCOSE SERPL-MCNC: 112 MG/DL (ref 70–110)
HGB BLD-MCNC: 10 G/DL (ref 12–16)
INR PPP: 2.5 (ref 0.8–1.2)
POTASSIUM SERPL-SCNC: 4.7 MMOL/L (ref 3.5–5.1)
PROT SERPL-MCNC: 6.4 G/DL (ref 6–8.4)
PROTHROMBIN TIME: 25.6 SEC (ref 9–12.5)
SODIUM SERPL-SCNC: 143 MMOL/L (ref 136–145)

## 2023-05-04 PROCEDURE — 85018 HEMOGLOBIN: CPT | Performed by: INTERNAL MEDICINE

## 2023-05-04 PROCEDURE — 36415 PR COLLECTION VENOUS BLOOD,VENIPUNCTURE: ICD-10-PCS | Mod: S$GLB,,, | Performed by: INTERNAL MEDICINE

## 2023-05-04 PROCEDURE — 36415 COLL VENOUS BLD VENIPUNCTURE: CPT | Mod: S$GLB,,, | Performed by: INTERNAL MEDICINE

## 2023-05-04 PROCEDURE — 85610 PROTHROMBIN TIME: CPT | Performed by: INTERNAL MEDICINE

## 2023-05-04 PROCEDURE — 80053 COMPREHEN METABOLIC PANEL: CPT | Performed by: INTERNAL MEDICINE

## 2023-05-10 ENCOUNTER — OFFICE VISIT (OUTPATIENT)
Dept: CARDIOLOGY | Facility: CLINIC | Age: 73
End: 2023-05-10
Payer: MEDICARE

## 2023-05-10 VITALS
SYSTOLIC BLOOD PRESSURE: 128 MMHG | BODY MASS INDEX: 31.11 KG/M2 | HEIGHT: 67 IN | DIASTOLIC BLOOD PRESSURE: 61 MMHG | WEIGHT: 198.19 LBS | HEART RATE: 77 BPM

## 2023-05-10 DIAGNOSIS — E66.9 OBESITY, CLASS I, BMI 30-34.9: Chronic | ICD-10-CM

## 2023-05-10 DIAGNOSIS — I34.0 NONRHEUMATIC MITRAL VALVE REGURGITATION: Chronic | ICD-10-CM

## 2023-05-10 DIAGNOSIS — E78.00 HYPERCHOLESTEROLEMIA: Chronic | ICD-10-CM

## 2023-05-10 DIAGNOSIS — Z79.01 LONG TERM (CURRENT) USE OF ANTICOAGULANTS: Chronic | ICD-10-CM

## 2023-05-10 DIAGNOSIS — I48.3 TYPICAL ATRIAL FLUTTER: Chronic | ICD-10-CM

## 2023-05-10 DIAGNOSIS — I50.1 LEFT HEART FAILURE: Primary | Chronic | ICD-10-CM

## 2023-05-10 DIAGNOSIS — I48.0 PAF (PAROXYSMAL ATRIAL FIBRILLATION): Chronic | ICD-10-CM

## 2023-05-10 DIAGNOSIS — N18.31 STAGE 3A CHRONIC KIDNEY DISEASE: Chronic | ICD-10-CM

## 2023-05-10 PROCEDURE — 99214 OFFICE O/P EST MOD 30 MIN: CPT | Mod: S$GLB,,, | Performed by: INTERNAL MEDICINE

## 2023-05-10 PROCEDURE — 99214 PR OFFICE/OUTPT VISIT, EST, LEVL IV, 30-39 MIN: ICD-10-PCS | Mod: S$GLB,,, | Performed by: INTERNAL MEDICINE

## 2023-05-10 RX ORDER — METOPROLOL SUCCINATE 25 MG/1
25 TABLET, EXTENDED RELEASE ORAL DAILY
Qty: 90 TABLET | Refills: 1 | Status: SHIPPED | OUTPATIENT
Start: 2023-05-10 | End: 2023-11-22 | Stop reason: SDUPTHER

## 2023-05-10 NOTE — PROGRESS NOTES
Subjective:    Patient ID:  Veronica Monroy is a 73 y.o. female who presents for Follow-up, Atrial Fibrillation, and Congestive Heart Failure        Follow-up  Pertinent negatives include no abdominal pain, change in bowel habit, chest pain, chills, congestion, coughing, diaphoresis, fever, headaches, nausea, numbness or rash.   Atrial Fibrillation  Symptoms are negative for chest pain, palpitations, shortness of breath and syncope. Past medical history includes atrial fibrillation and CHF.   Congestive Heart Failure  Pertinent negatives include no abdominal pain, chest pain, claudication, near-syncope, palpitations or shortness of breath.   DISCUSSED LABS AND GOALS GFR 53, HB 10,INR 2.5,  HAD ROTATOR CUFF SURGERY 6 WEEKS AGO,IN PT,  DOING WELL CARDIAC WISE, LESS FATIGUE, SEE ROS    Past Medical History:   Diagnosis Date    A-fib     Anticoagulant long-term use     Arthritis     Hyperlipidemia      Past Surgical History:   Procedure Laterality Date    APPENDECTOMY      CATARACT Bilateral     CORONARY ANGIOGRAPHY N/A 2022    Procedure: ANGIOGRAM, CORONARY ARTERY;  Surgeon: Yon Palma MD;  Location: [facility] CATH;  Service: Cardiology;  Laterality: N/A;    HYSTERECTOMY      HYSTERECTOMY      LEFT HEART CATHETERIZATION Left 2022    Procedure: Left heart cath;  Surgeon: Yon Palma MD;  Location: [facility]ATH;  Service: Cardiology;  Laterality: Left;     Family History   Problem Relation Age of Onset    Heart disease Mother     Arrhythmia Mother      Social History     Socioeconomic History    Marital status:    Tobacco Use    Smoking status: Former     Types: Cigarettes     Start date: 3/18/2010     Quit date: 3/18/2016     Years since quittin.1    Smokeless tobacco: Never   Substance and Sexual Activity    Alcohol use: Not Currently    Drug use: Yes     Types: Marijuana     Comment: cbd oil    Sexual activity: Not Currently       Review of patient's allergies indicates:  No Known Allergies    Current  Outpatient Medications:     gabapentin (NEURONTIN) 600 MG tablet, Take 600 mg by mouth 2 (two) times daily. prn, Disp: , Rfl:     HYDROcodone-acetaminophen (NORCO) 7.5-325 mg per tablet, Take 1 tablet by mouth every 6 (six) hours as needed for Pain., Disp: , Rfl:     JANTOVEN 2 mg tablet, Take 1tab qpm or as instructed by [facility]mad[facility] * *RAKESH BRAND MEDICALLY NECESSARY-WARFARIN FAILURE* *, Disp: 30 tablet, Rfl: 3    ondansetron (ZOFRAN) 4 MG tablet, Take 4 mg by mouth every 8 (eight) hours as needed., Disp: , Rfl:     predniSONE (DELTASONE) 5 MG tablet, Take 5 mg by mouth daily as needed., Disp: , Rfl:     sacubitriL-valsartan (ENTRESTO) 24-26 mg per tablet, Take 1 tablet by mouth 2 (two) times daily. (Patient taking differently: Take 1 tablet by mouth every evening.), Disp: 60 tablet, Rfl: 2    metoprolol succinate (TOPROL-XL) 25 MG 24 hr tablet, Take 1 tablet (25 mg total) by mouth once daily., Disp: 90 tablet, Rfl: 1    Review of Systems   Constitutional: Positive for weight loss (2 LBS). Negative for chills, diaphoresis, fever, malaise/fatigue and night sweats.   HENT:  Negative for congestion and nosebleeds.    Eyes:  Negative for blurred vision and visual disturbance.   Cardiovascular:  Negative for chest pain, claudication, cyanosis, dyspnea on exertion (MILD), irregular heartbeat, leg swelling, near-syncope, orthopnea, palpitations, paroxysmal nocturnal dyspnea and syncope.   Respiratory:  Negative for cough, hemoptysis and shortness of breath.    Endocrine: Negative.    Hematologic/Lymphatic: Negative for adenopathy. Does not bruise/bleed easily.   Skin:  Negative for color change and rash.   Musculoskeletal:  Positive for joint pain. Negative for falls.   Gastrointestinal:  Negative for abdominal pain, change in bowel habit, dysphagia, jaundice, melena and nausea.   Genitourinary:  Negative for dysuria and flank pain.   Neurological:  Negative for brief paralysis, headaches, light-headedness, loss of  "balance, numbness and paresthesias.   Psychiatric/Behavioral:  Negative for altered mental status and depression.    Allergic/Immunologic: Negative for persistent infections.      Objective:      Vitals:    05/10/23 1011   BP: 128/61   Pulse: 77   Weight: 89.9 kg (198 lb 3.1 oz)   Height: 5' 7" (1.702 m)   PainSc: 0-No pain     Body mass index is 31.04 kg/m².    Physical Exam  Constitutional:       Appearance: She is obese.   HENT:      Head: Normocephalic and atraumatic.   Eyes:      General: No scleral icterus.     Extraocular Movements: Extraocular movements intact.   Neck:      Vascular: No carotid bruit.   Cardiovascular:      Rate and Rhythm: Normal rate and regular rhythm. No extrasystoles are present.     Pulses:           Carotid pulses are 2+ on the right side and 2+ on the left side.       Radial pulses are 2+ on the right side and 2+ on the left side.        Posterior tibial pulses are 2+ on the right side and 2+ on the left side.      Heart sounds: Murmur heard.   Systolic murmur is present with a grade of 1/6 at the lower left sternal border.     No friction rub. No gallop.   Pulmonary:      Effort: Pulmonary effort is normal.      Breath sounds: Normal breath sounds and air entry. No rales.   Abdominal:      Palpations: Abdomen is soft.      Tenderness: There is no abdominal tenderness.   Musculoskeletal:      Cervical back: Neck supple.      Right lower leg: No edema.      Left lower leg: No edema.   Skin:     General: Skin is warm and dry.      Capillary Refill: Capillary refill takes less than 2 seconds.   Neurological:      General: No focal deficit present.      Mental Status: She is alert and oriented to person, place, and time.   Psychiatric:         Mood and Affect: Mood normal.         Speech: Speech normal.         Behavior: Behavior normal.               ..    Chemistry        Component Value Date/Time     05/04/2023 0932    K 4.7 05/04/2023 0932     05/04/2023 0932    CO2 25 " 05/04/2023 0932    BUN 18 05/04/2023 0932    CREATININE 1.1 05/04/2023 0932     (H) 05/04/2023 0932        Component Value Date/Time    CALCIUM 9.3 05/04/2023 0932    ALKPHOS 66 05/04/2023 0932    AST 14 05/04/2023 0932    ALT 11 05/04/2023 0932    BILITOT 0.5 05/04/2023 0932    ESTGFRAFRICA >60 05/16/2022 0916    EGFRNONAA >60 05/16/2022 0916            ..  Lab Results   Component Value Date    CHOL 171 03/18/2022     Lab Results   Component Value Date    HDL 63 03/18/2022     Lab Results   Component Value Date    LDLCALC 92.6 03/18/2022     Lab Results   Component Value Date    TRIG 77 03/18/2022     Lab Results   Component Value Date    CHOLHDL 36.8 03/18/2022     ..  Lab Results   Component Value Date    WBC 4.77 05/16/2022    HGB 10.0 (L) 05/04/2023    HCT 29.1 (L) 05/16/2022     (H) 05/16/2022     05/16/2022       Test(s) Reviewed  I have reviewed the following in detail:  [] Stress test   [] Angiography   [] Echocardiogram   [] Labs   [] Other:       Assessment:         ICD-10-CM ICD-9-CM   1. Left heart failure  I50.1 428.1   2. PAF (paroxysmal atrial fibrillation)  I48.0 427.31   3. Nonrheumatic mitral valve regurgitation  I34.0 424.0   4. Long term (current) use of anticoagulants  Z79.01 V58.61   5. Obesity, Class I, BMI 30-34.9  E66.9 278.00   6. Stage 3a chronic kidney disease  N18.31 585.3   7. Hypercholesterolemia  E78.00 272.0   8. Typical atrial flutter  I48.3 427.32     Problem List Items Addressed This Visit          Cardiac/Vascular    Hypercholesterolemia    Nonrheumatic mitral valve regurgitation    PAF (paroxysmal atrial fibrillation)    Left heart failure - Primary    Relevant Orders    Basic Metabolic Panel       Renal/    Stage 3a chronic kidney disease    Relevant Orders    Hemoglobin    Basic Metabolic Panel       Hematology    Long term (current) use of anticoagulants    Relevant Orders    Hemoglobin       Endocrine    Obesity, Class I, BMI 30-34.9     Other Visit  Diagnoses       Typical atrial flutter  (Chronic)       RESOLVED    Relevant Medications    metoprolol succinate (TOPROL-XL) 25 MG 24 hr tablet             Plan:     DAILY WEIGHT, 2 LB PER DAY 5 LB PER WEEK RULE EXPLAINED, CLASS 2 HEART FAILURE NO ANGINA STABLE ARRHYTHMIA DIET EXERCISE WEIGHT LOSS, INCREASED CV RISK WITH CKD, RETURN TO CLINIC IN 6 MO WITH LABS      Left heart failure  -     Basic Metabolic Panel; Future; Expected date: 11/10/2023    PAF (paroxysmal atrial fibrillation)    Nonrheumatic mitral valve regurgitation    Long term (current) use of anticoagulants  -     Hemoglobin; Future; Expected date: 11/10/2023    Obesity, Class I, BMI 30-34.9    Stage 3a chronic kidney disease  -     Hemoglobin; Future; Expected date: 11/10/2023  -     Basic Metabolic Panel; Future; Expected date: 11/10/2023    Hypercholesterolemia    Typical atrial flutter  Comments:  RESOLVED  Orders:  -     metoprolol succinate (TOPROL-XL) 25 MG 24 hr tablet; Take 1 tablet (25 mg total) by mouth once daily.  Dispense: 90 tablet; Refill: 1    RTC Low level/low impact aerobic exercise 5x's/wk. Heart healthy diet and risk factor modification.    See labs and med orders.    Aerobic exercise 5x's/wk. Heart healthy diet and risk factor modification.    See labs and med orders.

## 2023-06-16 NOTE — TELEPHONE ENCOUNTER
Hello,       A Patient Assistance Application for Veronica Monroy - MRN  [id number] was faxed to your office @ [phone].  Please have Dr. Yon Palma review the application to ensure the prescription is correct. If correct, sign and fax the application back to the Pharmacy Patient Assistance Team @[phone].      If changes need to be made to this application, please let me know so corrections can be made, and the application will be faxed back to you for approval and signature.

## 2023-06-16 NOTE — TELEPHONE ENCOUNTER
Veronica Monroy has provided the necessary documents today to begin the enrollment process into the [employer](Entresto) program. The prescription portion of the application has been sent to the providers office for approval and signature.

## 2023-06-21 RX ORDER — SACUBITRIL AND VALSARTAN 24; 26 MG/1; MG/1
1 TABLET, FILM COATED ORAL 2 TIMES DAILY
Qty: 60 TABLET | Refills: 2 | Status: SHIPPED | OUTPATIENT
Start: 2023-06-21 | End: 2023-07-10 | Stop reason: SDUPTHER

## 2023-06-21 NOTE — TELEPHONE ENCOUNTER
----- Message from Karen Fang sent at 6/21/2023  8:03 AM CDT -----  Contact: Patient  Type:  RX Refill Request    Who Called:  Patient  Refill or New Rx:  Refill  RX Name and Strength:  sacubitriL-valsartan (ENTRESTO) 24-26 mg per tablet    How is the patient currently taking it? (ex. 1XDay):  Take 1 tablet by mouth 2 (two) times daily. - Oral  Is this a 30 day or 90 day RX:  60 tablet 2  Preferred Pharmacy with phone number:    [facility] [address] [address]  Phone: [phone] Fax: [phone]  Local or Mail Order:  Local  Ordering Provider:  [facility] Call Back Number:  [phone]  Additional Information:  She needs it at a reduced rate and needs another copy of the application for the program. Please call the patient back at the phone number listed above to advise. Thank you!

## 2023-06-21 NOTE — TELEPHONE ENCOUNTER
The signed and completed patient assistance application was received from the providers office and  has been submitted to [employer](Entresto) for consideration of eligibility.

## 2023-06-22 ENCOUNTER — TELEPHONE (OUTPATIENT)
Dept: CARDIOLOGY | Facility: CLINIC | Age: 73
End: 2023-06-22
Payer: MEDICARE

## 2023-06-22 NOTE — TELEPHONE ENCOUNTER
----- Message from Jazmyne Franco sent at 6/22/2023  8:05 AM CDT -----  Contact: patient  Type:  Needs Medical Advice    Who Called: patient     Would the patient rather a call back or a response via [facility]? Call     Best Call Back Number: [phone] (home)      Additional Information: Patient would like to speak with the nurse in regards to sacubitriL-valsartan (ENTRESTO) 24-26 mg per tablet. Patient did not realize that she had to fill out a new application to get medication so they are telling her till the application is process that she will have to pay $500 for medication and patient stated that she can't afford that. Patient wants to know if she can be prescribed something for now till the application goes through.     Please call to advise

## 2023-06-27 NOTE — TELEPHONE ENCOUNTER
I have informed Veronicadiego Monroy she has been approved in the [employer] Patient Assistance Program through 12/31/23. A 90 day supply of Entresto will be shipped to PATIENT'S HOME in 10-14 business days. Patient has 1 refill remaining and responsible for calling in her refills.  Updated proof of eligibility is required to re-enroll for 2024 calendar year.

## 2023-07-09 NOTE — PROGRESS NOTES
Subjective:    Patient ID:  Veronica Monroy is a 73 y.o. female who presents for PAF (paroxysmal atrial fibrillation) and Congestive Heart Failure        HPI  STATUS POST ADMISSION TO [facility] WITH WEAKNESS , UTI,  INFECTION, BLOOD PRESSURE WAS LOW DECREASED ENTRESTO, DOING BETTER, STILL WITH NAUSEA, WAS OFF ENTRESTO, HAD CORTISOL TEST PENDING, STARTED ZOLOFT ALSO, ECHO PAP 40-50, RECORDS REVIEWED ALSO DISCUSSED WITH  FOR A WHO WAS ADMITTING PHYSICIAN, SEE ROS    Past Medical History:   Diagnosis Date    A-fib     Anticoagulant long-term use     Arthritis     Hyperlipidemia      Past Surgical History:   Procedure Laterality Date    APPENDECTOMY      CATARACT Bilateral     CORONARY ANGIOGRAPHY N/A 2022    Procedure: ANGIOGRAM, CORONARY ARTERY;  Surgeon: Yon Palma MD;  Location: [facility] CATH;  Service: Cardiology;  Laterality: N/A;    HYSTERECTOMY      HYSTERECTOMY      LEFT HEART CATHETERIZATION Left 2022    Procedure: Left heart cath;  Surgeon: Yon Palma MD;  Location: [facility]ATH;  Service: Cardiology;  Laterality: Left;     Family History   Problem Relation Age of Onset    Heart disease Mother     Arrhythmia Mother      Social History     Socioeconomic History    Marital status:    Tobacco Use    Smoking status: Former     Types: Cigarettes     Start date: 3/18/2010     Quit date: 3/18/2016     Years since quittin.3    Smokeless tobacco: Never   Substance and Sexual Activity    Alcohol use: Not Currently    Drug use: Yes     Types: Marijuana     Comment: cbd oil    Sexual activity: Not Currently       Review of patient's allergies indicates:  No Known Allergies    Current Outpatient Medications:     gabapentin (NEURONTIN) 600 MG tablet, Take 600 mg by mouth 2 (two) times daily. prn, Disp: , Rfl:     HYDROcodone-acetaminophen (NORCO) 7.5-325 mg per tablet, Take 1 tablet by mouth every 6 (six) hours as needed for Pain., Disp: , Rfl:     JANTOVEN 2 mg tablet, TAKE 1/2 TO 1  "TABLET BY MOUTH IN THE EVENING OR AS INSTRUCTED BY COUMADIN CLINIC, Disp: 30 tablet, Rfl: 4    metoprolol succinate (TOPROL-XL) 25 MG 24 hr tablet, Take 1 tablet (25 mg total) by mouth once daily., Disp: 90 tablet, Rfl: 1    ondansetron (ZOFRAN) 4 MG tablet, Take 4 mg by mouth every 8 (eight) hours as needed., Disp: , Rfl:     predniSONE (DELTASONE) 5 MG tablet, Take 5 mg by mouth daily as needed., Disp: , Rfl:     sacubitriL-valsartan (ENTRESTO) 24-26 mg per tablet, Take 1 tablet by mouth 2 (two) times daily., Disp: 60 tablet, Rfl: 2  No current facility-administered medications for this visit.    Review of Systems   Constitutional: Positive for malaise/fatigue. Negative for chills, diaphoresis, fever, night sweats and weight loss.   HENT:  Negative for congestion and nosebleeds.    Eyes:  Negative for blurred vision and visual disturbance.   Cardiovascular:  Negative for chest pain, claudication, cyanosis, dyspnea on exertion (MILD), irregular heartbeat, leg swelling, near-syncope, orthopnea, palpitations, paroxysmal nocturnal dyspnea and syncope.   Respiratory:  Negative for cough, hemoptysis and shortness of breath.    Endocrine: Negative.    Hematologic/Lymphatic: Negative for adenopathy. Does not bruise/bleed easily.   Skin:  Negative for color change and rash.   Musculoskeletal:  Positive for joint pain. Negative for falls.   Gastrointestinal:  Positive for diarrhea and nausea. Negative for abdominal pain, change in bowel habit, dysphagia, jaundice and melena.   Genitourinary:  Negative for dysuria and flank pain.   Neurological:  Negative for brief paralysis, headaches, light-headedness, loss of balance, numbness and paresthesias.   Psychiatric/Behavioral:  Negative for altered mental status and depression.    Allergic/Immunologic: Negative for hives and persistent infections.      Objective:      Vitals:    07/10/23 0834   BP: 131/70   Pulse: 64   Weight: 86.4 kg (190 lb 7.6 oz)   Height: 5' 7" (1.702 m) "   PainSc: 0-No pain     Body mass index is 29.83 kg/m².    Physical Exam  Constitutional:       Appearance: Normal appearance. She is overweight.   HENT:      Head: Normocephalic and atraumatic.   Eyes:      General: No scleral icterus.     Extraocular Movements: Extraocular movements intact.   Neck:      Vascular: Carotid bruit present. No JVD.   Cardiovascular:      Rate and Rhythm: Normal rate and regular rhythm. No extrasystoles are present.     Pulses:           Carotid pulses are 2+ on the right side with bruit and 2+ on the left side.       Radial pulses are 2+ on the right side and 2+ on the left side.        Posterior tibial pulses are 2+ on the right side and 2+ on the left side.      Heart sounds: Murmur heard.   Systolic murmur is present with a grade of 1/6.     No friction rub. No gallop.   Pulmonary:      Effort: Pulmonary effort is normal.      Breath sounds: Normal breath sounds and air entry. No rales.   Abdominal:      Palpations: Abdomen is soft.      Tenderness: There is no abdominal tenderness.   Musculoskeletal:      Cervical back: Neck supple.      Right lower leg: No edema.      Left lower leg: No edema.   Skin:     General: Skin is warm and dry.      Capillary Refill: Capillary refill takes less than 2 seconds.   Neurological:      General: No focal deficit present.      Mental Status: She is alert and oriented to person, place, and time.   Psychiatric:         Mood and Affect: Mood normal.         Speech: Speech normal.         Behavior: Behavior normal.               ..    Chemistry        Component Value Date/Time     05/04/2023 0932    K 4.7 05/04/2023 0932     05/04/2023 0932    CO2 25 05/04/2023 0932    BUN 18 05/04/2023 0932    CREATININE 1.1 05/04/2023 0932     (H) 05/04/2023 0932        Component Value Date/Time    CALCIUM 9.3 05/04/2023 0932    ALKPHOS 66 05/04/2023 0932    AST 14 05/04/2023 0932    ALT 11 05/04/2023 0932    BILITOT 0.5 05/04/2023 0932     ESTGFRAFRICA >60 05/16/2022 0916    EGFRNONAA >60 05/16/2022 0916            ..  Lab Results   Component Value Date    CHOL 166 07/02/2023    CHOL 171 03/18/2022     Lab Results   Component Value Date    HDL 51 07/02/2023    HDL 63 03/18/2022     Lab Results   Component Value Date    LDLCALC 97 07/02/2023    LDLCALC 92.6 03/18/2022     Lab Results   Component Value Date    TRIG 90 07/02/2023    TRIG 77 03/18/2022     Lab Results   Component Value Date    CHOLHDL 36.8 03/18/2022     ..  Lab Results   Component Value Date    WBC 4.77 05/16/2022    HGB 10.0 (L) 05/04/2023    HCT 29.1 (L) 05/16/2022     (H) 05/16/2022     05/16/2022       Test(s) Reviewed  I have reviewed the following in detail:  [] Stress test   [] Angiography   [] Echocardiogram   [x] Labs   [x] Other:       Assessment:         ICD-10-CM ICD-9-CM   1. Left heart failure  I50.1 428.1   2. Bruit of right carotid artery  R09.89 785.9   3. Pulmonary hypertension  I27.20 416.8   4. PAF (paroxysmal atrial fibrillation)  I48.0 427.31   5. Long term (current) use of anticoagulants  Z79.01 V58.61   6. Overweight (BMI 25.0-29.9)  E66.3 278.02     Problem List Items Addressed This Visit          Pulmonary    Pulmonary hypertension       Cardiac/Vascular    PAF (paroxysmal atrial fibrillation)    Left heart failure - Primary    Bruit of left carotid artery       Hematology    Long term (current) use of anticoagulants       Endocrine    Overweight (BMI 25.0-29.9)        Plan:     RE-START ENTRESTO 1/2 BID, IMPORTANCE OF MEDICATION DISCUSSED WITH THE PATIENT IN DETAILS HER EF HAS IMPROVED AND NORMALIZED AFTER MEDICATION AND MEDICAL TREATMENT, DAILY WEIGHT, 2 LB PER DAY 5 LB PER WEEK RULE EXPLAINED, NO ANGINA CLASS 2 HEART FAILURE NO ARRHYTHMIA OR STABLE ARRHYTHMIA DIET EXERCISE WEIGHT LOSS CONTINUE MEDICATIONS RETURN TO CLINIC IN FEW MONTHS, CHECK CAROTID ULTRASOUND      Left heart failure  Comments:  EF IMPROVED    Bruit of right carotid  artery  Comments:  ULTRASOUND  Orders:  -     CV Ultrasound Bilateral Doppler Carotid; Future    Pulmonary hypertension  Comments:  PER RECENT ECHO FROM [facility]    PAF (paroxysmal atrial fibrillation)    Long term (current) use of anticoagulants    Overweight (BMI 25.0-29.9)    Other orders  -     sacubitriL-valsartan (ENTRESTO) 24-26 mg per tablet; Take 1 tablet by mouth 2 (two) times daily.  Dispense: 60 tablet; Refill: 2    RTC Low level/low impact aerobic exercise 5x's/wk. Heart healthy diet and risk factor modification.    See labs and med orders.    Aerobic exercise 5x's/wk. Heart healthy diet and risk factor modification.    See labs and med orders.

## 2023-07-10 ENCOUNTER — OFFICE VISIT (OUTPATIENT)
Dept: CARDIOLOGY | Facility: CLINIC | Age: 73
End: 2023-07-10
Payer: MEDICARE

## 2023-07-10 VITALS
WEIGHT: 190.5 LBS | HEIGHT: 67 IN | HEART RATE: 64 BPM | BODY MASS INDEX: 29.9 KG/M2 | SYSTOLIC BLOOD PRESSURE: 131 MMHG | DIASTOLIC BLOOD PRESSURE: 70 MMHG

## 2023-07-10 DIAGNOSIS — I27.20 PULMONARY HYPERTENSION: ICD-10-CM

## 2023-07-10 DIAGNOSIS — Z79.01 LONG TERM (CURRENT) USE OF ANTICOAGULANTS: Chronic | ICD-10-CM

## 2023-07-10 DIAGNOSIS — I50.1 LEFT HEART FAILURE: Primary | Chronic | ICD-10-CM

## 2023-07-10 DIAGNOSIS — E66.3 OVERWEIGHT (BMI 25.0-29.9): Chronic | ICD-10-CM

## 2023-07-10 DIAGNOSIS — R09.89 BRUIT OF RIGHT CAROTID ARTERY: ICD-10-CM

## 2023-07-10 DIAGNOSIS — I48.0 PAF (PAROXYSMAL ATRIAL FIBRILLATION): Chronic | ICD-10-CM

## 2023-07-10 PROCEDURE — 99999 PR PBB SHADOW E&M-EST. PATIENT-LVL III: CPT | Mod: PBBFAC,,, | Performed by: INTERNAL MEDICINE

## 2023-07-10 PROCEDURE — 99213 OFFICE O/P EST LOW 20 MIN: CPT | Mod: PBBFAC,PO | Performed by: INTERNAL MEDICINE

## 2023-07-10 PROCEDURE — 99214 PR OFFICE/OUTPT VISIT, EST, LEVL IV, 30-39 MIN: ICD-10-PCS | Mod: S$PBB,,, | Performed by: INTERNAL MEDICINE

## 2023-07-10 PROCEDURE — 99999 PR PBB SHADOW E&M-EST. PATIENT-LVL III: ICD-10-PCS | Mod: PBBFAC,,, | Performed by: INTERNAL MEDICINE

## 2023-07-10 PROCEDURE — 99214 OFFICE O/P EST MOD 30 MIN: CPT | Mod: S$PBB,,, | Performed by: INTERNAL MEDICINE

## 2023-07-10 RX ORDER — SACUBITRIL AND VALSARTAN 24; 26 MG/1; MG/1
1 TABLET, FILM COATED ORAL 2 TIMES DAILY
Qty: 60 TABLET | Refills: 2 | Status: SHIPPED | OUTPATIENT
Start: 2023-07-10

## 2023-07-11 ENCOUNTER — CLINICAL SUPPORT (OUTPATIENT)
Dept: CARDIOLOGY | Facility: CLINIC | Age: 73
End: 2023-07-11
Attending: INTERNAL MEDICINE
Payer: MEDICARE

## 2023-07-11 DIAGNOSIS — R09.89 BRUIT OF RIGHT CAROTID ARTERY: ICD-10-CM

## 2023-07-11 LAB
LEFT CBA DIAS: 17 CM/S
LEFT CBA SYS: 65 CM/S
LEFT CCA DIST DIAS: 18 CM/S
LEFT CCA DIST SYS: 71 CM/S
LEFT CCA MID DIAS: 19 CM/S
LEFT CCA MID SYS: 99 CM/S
LEFT CCA PROX DIAS: 13 CM/S
LEFT CCA PROX SYS: 88 CM/S
LEFT ECA DIAS: 8 CM/S
LEFT ECA SYS: 89 CM/S
LEFT ICA DIST DIAS: 28 CM/S
LEFT ICA DIST SYS: 84 CM/S
LEFT ICA MID DIAS: 32 CM/S
LEFT ICA MID SYS: 91 CM/S
LEFT ICA PROX DIAS: 19 CM/S
LEFT ICA PROX SYS: 71 CM/S
LEFT VERTEBRAL DIAS: 11 CM/S
LEFT VERTEBRAL SYS: 51 CM/S
OHS CV CAROTID RIGHT ICA EDV HIGHEST: 35
OHS CV CAROTID ULTRASOUND LEFT ICA/CCA RATIO: 1.28
OHS CV CAROTID ULTRASOUND RIGHT ICA/CCA RATIO: 1.3
OHS CV PV CAROTID LEFT HIGHEST CCA: 99
OHS CV PV CAROTID LEFT HIGHEST ICA: 91
OHS CV PV CAROTID RIGHT HIGHEST CCA: 92
OHS CV PV CAROTID RIGHT HIGHEST ICA: 95
OHS CV US CAROTID LEFT HIGHEST EDV: 32
RIGHT CBA DIAS: 16 CM/S
RIGHT CBA SYS: 63 CM/S
RIGHT CCA DIST DIAS: 18 CM/S
RIGHT CCA DIST SYS: 73 CM/S
RIGHT CCA MID DIAS: 16 CM/S
RIGHT CCA MID SYS: 83 CM/S
RIGHT CCA PROX DIAS: 17 CM/S
RIGHT CCA PROX SYS: 92 CM/S
RIGHT ECA DIAS: 10 CM/S
RIGHT ECA SYS: 110 CM/S
RIGHT ICA DIST DIAS: 27 CM/S
RIGHT ICA DIST SYS: 87 CM/S
RIGHT ICA MID DIAS: 35 CM/S
RIGHT ICA MID SYS: 95 CM/S
RIGHT ICA PROX DIAS: 27 CM/S
RIGHT ICA PROX SYS: 88 CM/S
RIGHT VERTEBRAL DIAS: 27 CM/S
RIGHT VERTEBRAL SYS: 88 CM/S

## 2023-07-11 PROCEDURE — 93880 CV US DOPPLER CAROTID (CUPID ONLY): ICD-10-PCS | Mod: S$GLB,,, | Performed by: INTERNAL MEDICINE

## 2023-07-11 PROCEDURE — 93880 EXTRACRANIAL BILAT STUDY: CPT | Mod: S$GLB,,, | Performed by: INTERNAL MEDICINE

## 2023-07-12 ENCOUNTER — TELEPHONE (OUTPATIENT)
Dept: CARDIOLOGY | Facility: CLINIC | Age: 73
End: 2023-07-12
Payer: MEDICARE

## 2023-11-14 ENCOUNTER — LAB VISIT (OUTPATIENT)
Dept: PRIMARY CARE CLINIC | Facility: CLINIC | Age: 73
End: 2023-11-14
Payer: MEDICARE

## 2023-11-14 DIAGNOSIS — N18.31 STAGE 3A CHRONIC KIDNEY DISEASE: Chronic | ICD-10-CM

## 2023-11-14 DIAGNOSIS — Z79.01 LONG TERM (CURRENT) USE OF ANTICOAGULANTS: Chronic | ICD-10-CM

## 2023-11-14 DIAGNOSIS — I50.1 LEFT HEART FAILURE: Chronic | ICD-10-CM

## 2023-11-14 LAB
ANION GAP SERPL CALC-SCNC: 7 MMOL/L (ref 8–16)
BUN SERPL-MCNC: 13 MG/DL (ref 8–23)
CALCIUM SERPL-MCNC: 8.9 MG/DL (ref 8.7–10.5)
CHLORIDE SERPL-SCNC: 108 MMOL/L (ref 95–110)
CO2 SERPL-SCNC: 27 MMOL/L (ref 23–29)
CREAT SERPL-MCNC: 0.9 MG/DL (ref 0.5–1.4)
EST. GFR  (NO RACE VARIABLE): >60 ML/MIN/1.73 M^2
GLUCOSE SERPL-MCNC: 93 MG/DL (ref 70–110)
HGB BLD-MCNC: 10.4 G/DL (ref 12–16)
POTASSIUM SERPL-SCNC: 4.8 MMOL/L (ref 3.5–5.1)
SODIUM SERPL-SCNC: 142 MMOL/L (ref 136–145)

## 2023-11-14 PROCEDURE — 36415 PR COLLECTION VENOUS BLOOD,VENIPUNCTURE: ICD-10-PCS | Mod: S$GLB,,, | Performed by: INTERNAL MEDICINE

## 2023-11-14 PROCEDURE — 36415 COLL VENOUS BLD VENIPUNCTURE: CPT | Mod: S$GLB,,, | Performed by: INTERNAL MEDICINE

## 2023-11-14 PROCEDURE — 80048 BASIC METABOLIC PNL TOTAL CA: CPT | Performed by: INTERNAL MEDICINE

## 2023-11-14 PROCEDURE — 85018 HEMOGLOBIN: CPT | Performed by: INTERNAL MEDICINE

## 2023-11-22 ENCOUNTER — OFFICE VISIT (OUTPATIENT)
Dept: CARDIOLOGY | Facility: CLINIC | Age: 73
End: 2023-11-22
Payer: MEDICARE

## 2023-11-22 VITALS
WEIGHT: 184.5 LBS | BODY MASS INDEX: 28.9 KG/M2 | SYSTOLIC BLOOD PRESSURE: 109 MMHG | DIASTOLIC BLOOD PRESSURE: 52 MMHG | HEART RATE: 52 BPM

## 2023-11-22 DIAGNOSIS — I42.0 DILATED CARDIOMYOPATHY: Primary | Chronic | ICD-10-CM

## 2023-11-22 DIAGNOSIS — Z79.01 LONG TERM (CURRENT) USE OF ANTICOAGULANTS: Chronic | ICD-10-CM

## 2023-11-22 DIAGNOSIS — I27.20 PULMONARY HYPERTENSION: Chronic | ICD-10-CM

## 2023-11-22 DIAGNOSIS — E66.3 OVERWEIGHT (BMI 25.0-29.9): Chronic | ICD-10-CM

## 2023-11-22 DIAGNOSIS — I48.3 TYPICAL ATRIAL FLUTTER: Chronic | ICD-10-CM

## 2023-11-22 DIAGNOSIS — I34.0 NONRHEUMATIC MITRAL VALVE REGURGITATION: Chronic | ICD-10-CM

## 2023-11-22 DIAGNOSIS — N18.31 STAGE 3A CHRONIC KIDNEY DISEASE: Chronic | ICD-10-CM

## 2023-11-22 DIAGNOSIS — I65.23 CAROTID ARTERY PLAQUE, BILATERAL: Chronic | ICD-10-CM

## 2023-11-22 PROCEDURE — 99214 PR OFFICE/OUTPT VISIT, EST, LEVL IV, 30-39 MIN: ICD-10-PCS | Mod: S$GLB,,, | Performed by: INTERNAL MEDICINE

## 2023-11-22 PROCEDURE — 99214 OFFICE O/P EST MOD 30 MIN: CPT | Mod: S$GLB,,, | Performed by: INTERNAL MEDICINE

## 2023-11-22 RX ORDER — OMEPRAZOLE 40 MG/1
40 CAPSULE, DELAYED RELEASE ORAL DAILY PRN
COMMUNITY
Start: 2023-08-17 | End: 2024-02-14 | Stop reason: CLARIF

## 2023-11-22 RX ORDER — SERTRALINE HYDROCHLORIDE 100 MG/1
100 TABLET, FILM COATED ORAL DAILY
COMMUNITY

## 2023-11-22 RX ORDER — METOPROLOL SUCCINATE 25 MG/1
25 TABLET, EXTENDED RELEASE ORAL DAILY
Qty: 90 TABLET | Refills: 1 | Status: SHIPPED | OUTPATIENT
Start: 2023-11-22 | End: 2024-01-31 | Stop reason: SDUPTHER

## 2023-11-22 NOTE — PROGRESS NOTES
Subjective:    Patient ID:  Veronica Monroy is a 73 y.o. female who presents for Left heart failure        HPI  BMP OK GFR IMPROVED, HB 10.4, GETS STEROIDS KNEE INJECTION, DOING OK, CAROTID US, PLAQUE NO STENOSIS, [facility], PRESSURE ELEVATED SEE ROS    Past Medical History:   Diagnosis Date    A-fib     Anticoagulant long-term use     Arthritis     Hyperlipidemia      Past Surgical History:   Procedure Laterality Date    APPENDECTOMY      CATARACT Bilateral     CORONARY ANGIOGRAPHY N/A 2022    Procedure: ANGIOGRAM, CORONARY ARTERY;  Surgeon: Yon Palma MD;  Location: [facility] CATH;  Service: Cardiology;  Laterality: N/A;    HYSTERECTOMY      HYSTERECTOMY      LEFT HEART CATHETERIZATION Left 2022    Procedure: Left heart cath;  Surgeon: Yon Palma MD;  Location: [facility]ATH;  Service: Cardiology;  Laterality: Left;     Family History   Problem Relation Age of Onset    Heart disease Mother     Arrhythmia Mother      Social History     Socioeconomic History    Marital status:    Tobacco Use    Smoking status: Former     Current packs/day: 0.00     Types: Cigarettes     Start date: 3/18/2010     Quit date: 3/18/2016     Years since quittin.6    Smokeless tobacco: Never   Substance and Sexual Activity    Alcohol use: Not Currently    Drug use: Yes     Types: Marijuana     Comment: cbd oil    Sexual activity: Not Currently       Review of patient's allergies indicates:  No Known Allergies    Current Outpatient Medications:     apixaban (ELIQUIS) 5 mg Tab, Take 5 mg by mouth., Disp: , Rfl:     gabapentin (NEURONTIN) 600 MG tablet, Take 600 mg by mouth 2 (two) times daily. prn, Disp: , Rfl:     HYDROcodone-acetaminophen (NORCO) 7.5-325 mg per tablet, Take 1 tablet by mouth every 6 (six) hours as needed for Pain., Disp: , Rfl:     omeprazole (PRILOSEC) 40 MG capsule, Take 40 mg by mouth daily as needed., Disp: , Rfl:     ondansetron (ZOFRAN) 4 MG tablet, Take 4 mg by mouth every 8 (eight)  hours as needed., Disp: , Rfl:     predniSONE (DELTASONE) 5 MG tablet, Take 5 mg by mouth daily as needed., Disp: , Rfl:     sacubitriL-valsartan (ENTRESTO) 24-26 mg per tablet, Take 1 tablet by mouth 2 (two) times daily., Disp: 60 tablet, Rfl: 2    sertraline (ZOLOFT) 50 MG tablet, Take 50 mg by mouth., Disp: , Rfl:     metoprolol succinate (TOPROL-XL) 25 MG 24 hr tablet, Take 1 tablet (25 mg total) by mouth once daily., Disp: 90 tablet, Rfl: 1    Review of Systems   Constitutional: Negative for chills, diaphoresis, fever, malaise/fatigue (SOME), night sweats and weight loss.   HENT:  Negative for congestion and nosebleeds.    Eyes:  Negative for blurred vision and visual disturbance.   Cardiovascular:  Negative for chest pain, claudication, cyanosis, dyspnea on exertion (MILD), irregular heartbeat, leg swelling, near-syncope, orthopnea, palpitations, paroxysmal nocturnal dyspnea and syncope.   Respiratory:  Negative for cough, hemoptysis and shortness of breath.    Endocrine: Negative for polyphagia and polyuria.   Hematologic/Lymphatic: Negative for adenopathy. Does not bruise/bleed easily.   Skin:  Negative for color change and rash.   Musculoskeletal:  Positive for joint pain (KNEES). Negative for falls.   Gastrointestinal:  Negative for abdominal pain, change in bowel habit, dysphagia, jaundice, melena and nausea.   Genitourinary:  Negative for dysuria and flank pain.   Neurological:  Negative for brief paralysis, headaches, light-headedness, loss of balance and numbness.   Psychiatric/Behavioral:  Negative for altered mental status and depression.    Allergic/Immunologic: Negative for hives and persistent infections.        Objective:      Vitals:    11/22/23 1026   BP: (!) 109/52   Pulse: (!) 52   Weight: 83.7 kg (184 lb 8.4 oz)   PainSc: 0-No pain     Body mass index is 28.9 kg/m².    Physical Exam  Constitutional:       Appearance: Normal appearance. She is overweight.   HENT:      Head: Normocephalic and  atraumatic.   Eyes:      General: No scleral icterus.     Extraocular Movements: Extraocular movements intact.      Conjunctiva/sclera: Conjunctivae normal.   Neck:      Vascular: No JVD.   Cardiovascular:      Rate and Rhythm: Normal rate and regular rhythm. No extrasystoles are present.     Pulses:           Carotid pulses are 2+ on the right side and 2+ on the left side.       Radial pulses are 2+ on the right side and 2+ on the left side.        Posterior tibial pulses are 2+ on the right side and 2+ on the left side.      Heart sounds: Murmur heard.      Systolic murmur is present with a grade of 1/6 at the upper right sternal border and lower left sternal border.      No friction rub. No gallop.   Pulmonary:      Effort: Pulmonary effort is normal.      Breath sounds: Normal breath sounds and air entry. No rales.   Abdominal:      Palpations: Abdomen is soft.      Tenderness: There is no abdominal tenderness.   Musculoskeletal:      Cervical back: Neck supple.      Right lower leg: No edema.      Left lower leg: No edema.   Skin:     General: Skin is warm and dry.      Capillary Refill: Capillary refill takes less than 2 seconds.   Neurological:      General: No focal deficit present.      Mental Status: She is alert and oriented to person, place, and time.   Psychiatric:         Mood and Affect: Mood normal.         Speech: Speech normal.         Behavior: Behavior normal.                 ..    Chemistry        Component Value Date/Time     11/14/2023 0943    K 4.8 11/14/2023 0943     11/14/2023 0943    CO2 27 11/14/2023 0943    BUN 13 11/14/2023 0943    CREATININE 0.9 11/14/2023 0943    GLU 93 11/14/2023 0943        Component Value Date/Time    CALCIUM 8.9 11/14/2023 0943    ALKPHOS 66 05/04/2023 0932    AST 14 05/04/2023 0932    ALT 11 05/04/2023 0932    BILITOT 0.5 05/04/2023 0932    ESTGFRAFRICA >60 05/16/2022 0916    EGFRNONAA >60 05/16/2022 0916            ..  Lab Results   Component Value  Date    CHOL 166 07/02/2023    CHOL 171 03/18/2022     Lab Results   Component Value Date    HDL 51 07/02/2023    HDL 63 03/18/2022     Lab Results   Component Value Date    LDLCALC 97 07/02/2023    LDLCALC 92.6 03/18/2022     Lab Results   Component Value Date    TRIG 90 07/02/2023    TRIG 77 03/18/2022     Lab Results   Component Value Date    CHOLHDL 36.8 03/18/2022     ..  Lab Results   Component Value Date    WBC 4.77 05/16/2022    HGB 10.4 (L) 11/14/2023    HCT 29.1 (L) 05/16/2022     (H) 05/16/2022     05/16/2022       Test(s) Reviewed  I have reviewed the following in detail:  [] Stress test   [] Angiography   [] Echocardiogram   [x] Labs   [x] Other:       Assessment:         ICD-10-CM ICD-9-CM   1. Dilated cardiomyopathy  I42.0 425.4   2. Nonrheumatic mitral valve regurgitation  I34.0 424.0   3. Pulmonary hypertension  I27.20 416.8   4. Stage 3a chronic kidney disease  N18.31 585.3   5. Long term (current) use of anticoagulants  Z79.01 V58.61   6. Overweight (BMI 25.0-29.9)  E66.3 278.02   7. Carotid artery plaque, bilateral  I65.23 433.10     433.30   8. Typical atrial flutter  I48.3 427.32     Problem List Items Addressed This Visit          Cardiac/Vascular    Dilated cardiomyopathy - Primary    Relevant Orders    Echo    Nonrheumatic mitral valve regurgitation    Relevant Orders    Echo    Pulmonary hypertension    Relevant Orders    Echo    Carotid artery plaque, bilateral       Renal/    Stage 3a chronic kidney disease       Hematology    Long term (current) use of anticoagulants       Endocrine    Overweight (BMI 25.0-29.9)     Other Visit Diagnoses       Typical atrial flutter  (Chronic)       RESOLVED    Relevant Medications    metoprolol succinate (TOPROL-XL) 25 MG 24 hr tablet             Plan:     ALL CV CLINICALLY STABLE, NO ANGINA, NO OVERT HF IMPROVED, NO TIA, NO CLINICAL ARRHYTHMIA,CONTINUE CURRENT MEDS, EDUCATION, DIET, EXERCISE , DAILY WEIGHT IS POSSIBLE RETURN TO CLINIC  IN 4 MO WITH AN ECHO REASSESS VALVULAR DISEASE AND PA PRESSURE      Dilated cardiomyopathy  Comments:  EF IMPROVED  Orders:  -     Echo; Future; Expected date: 02/22/2024    Nonrheumatic mitral valve regurgitation  -     Echo; Future; Expected date: 02/22/2024    Pulmonary hypertension  Comments:  MONITOR  Orders:  -     Echo; Future; Expected date: 02/22/2024    Stage 3a chronic kidney disease    Long term (current) use of anticoagulants  Comments:  BACK ON ELIQUIS    Overweight (BMI 25.0-29.9)    Carotid artery plaque, bilateral    Typical atrial flutter  Comments:  RESOLVED  Orders:  -     metoprolol succinate (TOPROL-XL) 25 MG 24 hr tablet; Take 1 tablet (25 mg total) by mouth once daily.  Dispense: 90 tablet; Refill: 1    RTC Low level/low impact aerobic exercise 5x's/wk. Heart healthy diet and risk factor modification.    See labs and med orders.    Aerobic exercise 5x's/wk. Heart healthy diet and risk factor modification.    See labs and med orders.

## 2023-11-28 ENCOUNTER — CLINICAL SUPPORT (OUTPATIENT)
Dept: CARDIOLOGY | Facility: HOSPITAL | Age: 73
End: 2023-11-28
Attending: INTERNAL MEDICINE
Payer: MEDICARE

## 2023-11-28 VITALS — BODY MASS INDEX: 28.88 KG/M2 | HEIGHT: 67 IN | WEIGHT: 184 LBS

## 2023-11-28 DIAGNOSIS — I42.0 DILATED CARDIOMYOPATHY: Chronic | ICD-10-CM

## 2023-11-28 DIAGNOSIS — I34.0 NONRHEUMATIC MITRAL VALVE REGURGITATION: Chronic | ICD-10-CM

## 2023-11-28 DIAGNOSIS — I27.20 PULMONARY HYPERTENSION: Chronic | ICD-10-CM

## 2023-11-28 LAB
ASCENDING AORTA: 3.25 CM
AV INDEX (PROSTH): 0.78
AV MEAN GRADIENT: 4 MMHG
AV PEAK GRADIENT: 9 MMHG
AV VALVE AREA BY VELOCITY RATIO: 2.27 CM²
AV VALVE AREA: 2.2 CM²
AV VELOCITY RATIO: 0.81
BSA FOR ECHO PROCEDURE: 1.99 M2
CV ECHO LV RWT: 0.24 CM
DOP CALC AO PEAK VEL: 1.51 M/S
DOP CALC AO VTI: 36.1 CM
DOP CALC LVOT AREA: 2.8 CM2
DOP CALC LVOT DIAMETER: 1.89 CM
DOP CALC LVOT PEAK VEL: 1.22 M/S
DOP CALC LVOT STROKE VOLUME: 79.36 CM3
DOP CALCLVOT PEAK VEL VTI: 28.3 CM
E WAVE DECELERATION TIME: 237.45 MSEC
E/A RATIO: 3.32
E/E' RATIO: 8.74 M/S
ECHO LV POSTERIOR WALL: 0.74 CM (ref 0.6–1.1)
EJECTION FRACTION: 60 %
FRACTIONAL SHORTENING: 32 % (ref 28–44)
INTERVENTRICULAR SEPTUM: 0.74 CM (ref 0.6–1.1)
IVRT: 77.07 MSEC
LEFT ATRIUM SIZE: 5.18 CM
LEFT ATRIUM VOLUME INDEX MOD: 52.4 ML/M2
LEFT ATRIUM VOLUME MOD: 102.13 CM3
LEFT INTERNAL DIMENSION IN SYSTOLE: 4.12 CM (ref 2.1–4)
LEFT VENTRICLE DIASTOLIC VOLUME INDEX: 96.04 ML/M2
LEFT VENTRICLE DIASTOLIC VOLUME: 187.27 ML
LEFT VENTRICLE MASS INDEX: 89 G/M2
LEFT VENTRICLE SYSTOLIC VOLUME INDEX: 38.4 ML/M2
LEFT VENTRICLE SYSTOLIC VOLUME: 74.94 ML
LEFT VENTRICULAR INTERNAL DIMENSION IN DIASTOLE: 6.1 CM (ref 3.5–6)
LEFT VENTRICULAR MASS: 174.1 G
LV LATERAL E/E' RATIO: 6.92 M/S
LV SEPTAL E/E' RATIO: 11.86 M/S
LVOT MG: 2.8 MMHG
LVOT MV: 0.78 CM/S
MV PEAK A VEL: 0.25 M/S
MV PEAK E VEL: 0.83 M/S
MV STENOSIS PRESSURE HALF TIME: 68.86 MS
MV VALVE AREA P 1/2 METHOD: 3.19 CM2
PISA MRMAX VEL: 5.16 M/S
PISA TR MAX VEL: 2.81 M/S
PULM VEIN S/D RATIO: 0.6
PV PEAK D VEL: 0.7 M/S
PV PEAK S VEL: 0.42 M/S
RA MAJOR: 5.58 CM
RA PRESSURE ESTIMATED: 3 MMHG
RA WIDTH: 4.8 CM
RIGHT VENTRICULAR END-DIASTOLIC DIMENSION: 3.32 CM
RIGHT VENTRICULAR LENGTH IN DIASTOLE (APICAL 4-CHAMBER VIEW): 4.82 CM
RV MID DIAMA: 2.02 CM
RV TB RVSP: 6 MMHG
RV TISSUE DOPPLER FREE WALL SYSTOLIC VELOCITY 1 (APICAL 4 CHAMBER VIEW): 12.66 CM/S
SINUS: 2.98 CM
STJ: 2.51 CM
TDI LATERAL: 0.12 M/S
TDI SEPTAL: 0.07 M/S
TDI: 0.1 M/S
TR MAX PG: 32 MMHG
TRICUSPID ANNULAR PLANE SYSTOLIC EXCURSION: 2.48 CM
TV REST PULMONARY ARTERY PRESSURE: 35 MMHG
Z-SCORE OF LEFT VENTRICULAR DIMENSION IN END DIASTOLE: 0.93
Z-SCORE OF LEFT VENTRICULAR DIMENSION IN END SYSTOLE: 1.47

## 2023-11-28 PROCEDURE — 93306 TTE W/DOPPLER COMPLETE: CPT | Mod: PO

## 2023-11-28 PROCEDURE — 93306 TTE W/DOPPLER COMPLETE: CPT | Mod: 26,,, | Performed by: INTERNAL MEDICINE

## 2023-11-28 PROCEDURE — 93306 ECHO (CUPID ONLY): ICD-10-PCS | Mod: 26,,, | Performed by: INTERNAL MEDICINE

## 2023-12-05 ENCOUNTER — TELEPHONE (OUTPATIENT)
Dept: PHARMACY | Facility: CLINIC | Age: 73
End: 2023-12-05
Payer: MEDICARE

## 2023-12-06 NOTE — TELEPHONE ENCOUNTER
We have contacted Veronica Monroy to informed her of the re-enrollment process for the  [facility] Patient Assistance Program and what's she needs to provide to continue receiving Entresto through [facility] Patient Assistance Program.      The following documents may be required to re enroll for the 2023 calendar year: Proof of household Income( such as social security statement, 1099 form, pension statement or 3 consecutive pay stubs, Copy of all Insurance cards( front and back), and Signed and dated HIPAA /Patient Information Forms          Thank you   Pharmacy Patient Assistance

## 2023-12-28 ENCOUNTER — PATIENT MESSAGE (OUTPATIENT)
Dept: PHARMACY | Facility: CLINIC | Age: 73
End: 2023-12-28
Payer: MEDICARE

## 2023-12-28 NOTE — TELEPHONE ENCOUNTER
A Patient Assistance Application for Veronica Monroy [id number] was faxed to your office @ [phone]. Please have Dr. Yon Palma  review the application to ensure the prescription is correct. If correct, sign and fax the application back to the Pharmacy Patient Assistance Team @[phone].

## 2024-01-25 ENCOUNTER — PATIENT MESSAGE (OUTPATIENT)
Dept: PHARMACY | Facility: CLINIC | Age: 74
End: 2024-01-25
Payer: MEDICARE

## 2024-01-31 ENCOUNTER — OFFICE VISIT (OUTPATIENT)
Dept: CARDIOLOGY | Facility: CLINIC | Age: 74
End: 2024-01-31
Payer: MEDICARE

## 2024-01-31 VITALS
SYSTOLIC BLOOD PRESSURE: 125 MMHG | DIASTOLIC BLOOD PRESSURE: 59 MMHG | WEIGHT: 186.5 LBS | HEART RATE: 55 BPM | BODY MASS INDEX: 29.27 KG/M2 | HEIGHT: 67 IN

## 2024-01-31 DIAGNOSIS — I42.0 DILATED CARDIOMYOPATHY: ICD-10-CM

## 2024-01-31 DIAGNOSIS — Z79.01 LONG TERM (CURRENT) USE OF ANTICOAGULANTS: Chronic | ICD-10-CM

## 2024-01-31 DIAGNOSIS — N18.31 STAGE 3A CHRONIC KIDNEY DISEASE: ICD-10-CM

## 2024-01-31 DIAGNOSIS — I50.42 CHRONIC COMBINED SYSTOLIC AND DIASTOLIC HEART FAILURE: Chronic | ICD-10-CM

## 2024-01-31 DIAGNOSIS — I27.20 PULMONARY HYPERTENSION: Chronic | ICD-10-CM

## 2024-01-31 DIAGNOSIS — I48.3 TYPICAL ATRIAL FLUTTER: Chronic | ICD-10-CM

## 2024-01-31 DIAGNOSIS — I34.0 MODERATE TO SEVERE MITRAL REGURGITATION: Primary | ICD-10-CM

## 2024-01-31 PROCEDURE — 99215 OFFICE O/P EST HI 40 MIN: CPT | Mod: S$GLB,,, | Performed by: INTERNAL MEDICINE

## 2024-01-31 RX ORDER — NAPROXEN SODIUM 220 MG/1
81 TABLET, FILM COATED ORAL ONCE
Status: CANCELLED | OUTPATIENT
Start: 2024-01-31 | End: 2024-01-31

## 2024-01-31 RX ORDER — SODIUM CHLORIDE 9 MG/ML
INJECTION, SOLUTION INTRAVENOUS ONCE
Status: CANCELLED | OUTPATIENT
Start: 2024-01-31 | End: 2024-01-31

## 2024-01-31 RX ORDER — METOPROLOL SUCCINATE 25 MG/1
25 TABLET, EXTENDED RELEASE ORAL DAILY
Qty: 90 TABLET | Refills: 1 | Status: SHIPPED | OUTPATIENT
Start: 2024-01-31

## 2024-01-31 RX ORDER — CLOPIDOGREL BISULFATE 300 MG/1
300 TABLET, FILM COATED ORAL ONCE
Status: CANCELLED | OUTPATIENT
Start: 2024-01-31 | End: 2024-01-31

## 2024-01-31 NOTE — PROGRESS NOTES
Subjective:    Patient ID:  Veronica Monroy is a 73 y.o. female who presents for Cardiomyopathy        HPI    DISCUSSED TESTS ECHO SEVERE MR, , A FIB, INCREASED SOB, SEVERE LAE, PAP 35, OVERALL WORSENING VALVULAR DISEASE CHANGE TO CONTINUED SYMPTOMS RECURRENT ATRIAL FIBRILLATION IN THE SETTING OF MITRAL REGURGITATION, WILL NEED FURTHER EVALUATION SEE REVIEW OF SYSTEMS  Past Medical History:   Diagnosis Date    A-fib     Anticoagulant long-term use     Arthritis     Hyperlipidemia      Past Surgical History:   Procedure Laterality Date    APPENDECTOMY      CATARACT Bilateral     CORONARY ANGIOGRAPHY N/A 2022    Procedure: ANGIOGRAM, CORONARY ARTERY;  Surgeon: Yon Palma MD;  Location: [facility] CATH;  Service: Cardiology;  Laterality: N/A;    HYSTERECTOMY      HYSTERECTOMY      LEFT HEART CATHETERIZATION Left 2022    Procedure: Left heart cath;  Surgeon: Yon Palma MD;  Location: [facility];  Service: Cardiology;  Laterality: Left;     Family History   Problem Relation Age of Onset    Heart disease Mother     Arrhythmia Mother      Social History     Socioeconomic History    Marital status:    Tobacco Use    Smoking status: Former     Current packs/day: 0.00     Types: Cigarettes     Start date: 3/18/2010     Quit date: 3/18/2016     Years since quittin.8    Smokeless tobacco: Never   Substance and Sexual Activity    Alcohol use: Not Currently    Drug use: Yes     Types: Marijuana     Comment: cbd oil    Sexual activity: Not Currently       Review of patient's allergies indicates:  No Known Allergies    Current Outpatient Medications:     apixaban (ELIQUIS) 5 mg Tab, Take 5 mg by mouth., Disp: , Rfl:     gabapentin (NEURONTIN) 600 MG tablet, Take 600 mg by mouth 2 (two) times daily. prn, Disp: , Rfl:     ondansetron (ZOFRAN) 4 MG tablet, Take 4 mg by mouth every 8 (eight) hours as needed., Disp: , Rfl:     sacubitriL-valsartan (ENTRESTO) 24-26 mg per tablet, Take 1 tablet by mouth 2 (two) times  "daily., Disp: 60 tablet, Rfl: 2    sertraline (ZOLOFT) 50 MG tablet, Take 50 mg by mouth., Disp: , Rfl:     HYDROcodone-acetaminophen (NORCO) 7.5-325 mg per tablet, Take 1 tablet by mouth every 6 (six) hours as needed for Pain., Disp: , Rfl:     metoprolol succinate (TOPROL-XL) 25 MG 24 hr tablet, Take 1 tablet (25 mg total) by mouth once daily., Disp: 90 tablet, Rfl: 1    omeprazole (PRILOSEC) 40 MG capsule, Take 40 mg by mouth daily as needed., Disp: , Rfl:     predniSONE (DELTASONE) 5 MG tablet, Take 5 mg by mouth daily as needed., Disp: , Rfl:     Review of Systems   Constitutional: Negative for chills, diaphoresis, fever, malaise/fatigue (SOME), night sweats and weight loss.   HENT:  Negative for congestion and nosebleeds.    Eyes:  Negative for blurred vision and visual disturbance.   Cardiovascular:  Positive for dyspnea on exertion. Negative for chest pain, claudication, cyanosis, irregular heartbeat, leg swelling, near-syncope, orthopnea, palpitations (OCC), paroxysmal nocturnal dyspnea and syncope.   Respiratory:  Positive for shortness of breath. Negative for cough and hemoptysis.    Endocrine: Negative for polyphagia and polyuria.   Hematologic/Lymphatic: Negative for adenopathy. Does not bruise/bleed easily.   Skin:  Negative for color change and rash.   Musculoskeletal:  Positive for joint pain (KNEES). Negative for falls.   Gastrointestinal:  Negative for abdominal pain, change in bowel habit, dysphagia, jaundice, melena and nausea.   Genitourinary:  Negative for dysuria and flank pain.   Neurological:  Negative for brief paralysis, headaches, light-headedness, loss of balance and paresthesias.   Psychiatric/Behavioral:  Negative for altered mental status and depression.    Allergic/Immunologic: Negative for persistent infections.        Objective:      Vitals:    01/31/24 1115   BP: (!) 125/59   Pulse: (!) 55   Weight: 84.6 kg (186 lb 8.2 oz)   Height: 5' 7" (1.702 m)   PainSc: 0-No pain     Body " mass index is 29.21 kg/m².    Physical Exam  Constitutional:       Appearance: Normal appearance. She is overweight.   HENT:      Head: Normocephalic and atraumatic.   Eyes:      General: No scleral icterus.     Extraocular Movements: Extraocular movements intact.      Pupils: Pupils are equal, round, and reactive to light.   Neck:      Vascular: No JVD.   Cardiovascular:      Rate and Rhythm: Regular rhythm. Bradycardia present. No extrasystoles are present.     Pulses:           Carotid pulses are 2+ on the right side and 2+ on the left side.       Radial pulses are 2+ on the right side and 2+ on the left side.        Posterior tibial pulses are 2+ on the right side and 2+ on the left side.      Heart sounds: Murmur heard.      High-pitched blowing holosystolic murmur is present with a grade of 2/6 at the apex.      No friction rub. No gallop.   Pulmonary:      Effort: Pulmonary effort is normal.      Breath sounds: Normal breath sounds and air entry. No rales.   Abdominal:      Palpations: Abdomen is soft.      Tenderness: There is no abdominal tenderness.   Musculoskeletal:      Cervical back: Neck supple.      Right lower leg: No edema.      Left lower leg: No edema.   Skin:     General: Skin is warm and dry.      Capillary Refill: Capillary refill takes less than 2 seconds.   Neurological:      General: No focal deficit present.      Mental Status: She is alert and oriented to person, place, and time.   Psychiatric:         Mood and Affect: Mood normal.         Speech: Speech normal.         Behavior: Behavior normal.               ..    Chemistry        Component Value Date/Time     11/14/2023 0943    K 4.8 11/14/2023 0943     11/14/2023 0943    CO2 27 11/14/2023 0943    BUN 13 11/14/2023 0943    CREATININE 0.9 11/14/2023 0943    GLU 93 11/14/2023 0943        Component Value Date/Time    CALCIUM 8.9 11/14/2023 0943    ALKPHOS 66 05/04/2023 0932    AST 14 05/04/2023 0932    ALT 11 05/04/2023 0932     BILITOT 0.5 05/04/2023 0932    ESTGFRAFRICA >60 05/16/2022 0916    EGFRNONAA >60 05/16/2022 0916            ..  Lab Results   Component Value Date    CHOL 166 07/02/2023    CHOL 171 03/18/2022     Lab Results   Component Value Date    HDL 51 07/02/2023    HDL 63 03/18/2022     Lab Results   Component Value Date    LDLCALC 97 07/02/2023    LDLCALC 92.6 03/18/2022     Lab Results   Component Value Date    TRIG 90 07/02/2023    TRIG 77 03/18/2022     Lab Results   Component Value Date    CHOLHDL 36.8 03/18/2022     ..  Lab Results   Component Value Date    WBC 4.77 05/16/2022    HGB 10.4 (L) 11/14/2023    HCT 29.1 (L) 05/16/2022     (H) 05/16/2022     05/16/2022       Test(s) Reviewed  I have reviewed the following in detail:  [] Stress test   [] Angiography   [x] Echocardiogram   [] Labs   [] Other:       Assessment:         ICD-10-CM ICD-9-CM   1. Moderate to severe mitral regurgitation  I34.0 424.0   2. Pulmonary hypertension  I27.20 416.8   3. Dilated cardiomyopathy  I42.0 425.4   4. Stage 3a chronic kidney disease  N18.31 585.3   5. Chronic combined systolic and diastolic heart failure  I50.42 428.42   6. Long term (current) use of anticoagulants  Z79.01 V58.61   7. Typical atrial flutter  I48.3 427.32     Problem List Items Addressed This Visit          Cardiac/Vascular    Dilated cardiomyopathy    Relevant Orders    Vital signs    Cardiac Monitoring - Adult    Basic metabolic panel    Moderate to severe mitral regurgitation - Primary    Relevant Orders    Case Request-Cath Lab: CATHETERIZATION, HEART, BOTH LEFT AND RIGHT (Completed)    Vital signs    Cardiac Monitoring - Adult    Basic metabolic panel    Left heart failure    Pulmonary hypertension    Relevant Orders    Case Request-Cath Lab: CATHETERIZATION, HEART, BOTH LEFT AND RIGHT (Completed)    Vital signs    Cardiac Monitoring - Adult    Basic metabolic panel    Typical atrial flutter    Relevant Medications    metoprolol succinate  (TOPROL-XL) 25 MG 24 hr tablet       Renal/    Stage 3a chronic kidney disease       Hematology    Long term (current) use of anticoagulants        Plan:         WORSENING MITRAL REGURGITATION AND HEART FAILURE SYMPTOMS RELATED TO VALVULAR DISEASE IN ADDITION TO RECURRENT ATRIAL FIBRILLATION AND ARRHYTHMIA, WILL NEED FURTHER EVALUATION WILL LIKELY NEED MITRAL VALVE REPAIR/REPLACEMENT VERY LONG DISCUSSION WITH THE PATIENT ABOUT DISEASE PROCESS WILL PROCEED WITH ANGIOGRAPHY R/LHC , RISK AND ALTERNATIVE EXPLAINED PATIENT DETAILS, CONTINUE WITH DAILY WEIGHT 2 LB PER DAY 5 LB PER WEEK RULE, CLASS 2-3 HEART FAILURE RECURRENT CLINICAL ARRHYTHMIA NO TIA TYPE SYMPTOMS, RETURN TO CLINIC IN FEW WEEKS AFTER TESTS  Moderate to severe mitral regurgitation  Comments:  WORSENING  Orders:  -     Case Request-Cath Lab: CATHETERIZATION, HEART, BOTH LEFT AND RIGHT; Standing  -     Establish IV access and maintain saline lock; Standing  -     Hold Warfarin; Standing  -     Hold Enoxaparin/subcutaneous Heparin; Standing  -     Hold Heparin drip; Standing  -     Height and weight; Standing  -     Verify informed consent; Standing  -     Vital signs; Standing  -     Cardiac Monitoring - Adult; Standing  -     Pulse Oximetry Q4H; Standing  -     Diet NPO; Standing  -     CBC auto differential; Standing  -     Basic metabolic panel; Standing    Pulmonary hypertension  -     Case Request-Cath Lab: CATHETERIZATION, HEART, BOTH LEFT AND RIGHT; Standing  -     Establish IV access and maintain saline lock; Standing  -     Hold Warfarin; Standing  -     Hold Enoxaparin/subcutaneous Heparin; Standing  -     Hold Heparin drip; Standing  -     Height and weight; Standing  -     Verify informed consent; Standing  -     Vital signs; Standing  -     Cardiac Monitoring - Adult; Standing  -     Pulse Oximetry Q4H; Standing  -     Diet NPO; Standing  -     CBC auto differential; Standing  -     Basic metabolic panel; Standing    Dilated cardiomyopathy  -      Establish IV access and maintain saline lock; Standing  -     Hold Warfarin; Standing  -     Hold Enoxaparin/subcutaneous Heparin; Standing  -     Hold Heparin drip; Standing  -     Height and weight; Standing  -     Verify informed consent; Standing  -     Vital signs; Standing  -     Cardiac Monitoring - Adult; Standing  -     Pulse Oximetry Q4H; Standing  -     Diet NPO; Standing  -     CBC auto differential; Standing  -     Basic metabolic panel; Standing    Stage 3a chronic kidney disease    Chronic combined systolic and diastolic heart failure  Comments:  EF IMPROVED  Orders:  -     Case Request-Cath Lab: CATHETERIZATION, HEART, BOTH LEFT AND RIGHT; Standing  -     Establish IV access and maintain saline lock; Standing  -     Hold Warfarin; Standing  -     Hold Enoxaparin/subcutaneous Heparin; Standing  -     Hold Heparin drip; Standing  -     Height and weight; Standing  -     Verify informed consent; Standing  -     Vital signs; Standing  -     Cardiac Monitoring - Adult; Standing  -     Pulse Oximetry Q4H; Standing  -     Diet NPO; Standing  -     CBC auto differential; Standing  -     Basic metabolic panel; Standing    Long term (current) use of anticoagulants    Typical atrial flutter  Comments:  RESOLVED  Orders:  -     metoprolol succinate (TOPROL-XL) 25 MG 24 hr tablet; Take 1 tablet (25 mg total) by mouth once daily.  Dispense: 90 tablet; Refill: 1    Other orders  -     0.9%  NaCl infusion  -     aspirin chewable tablet 81 mg  -     clopidogreL tablet 300 mg    RTC Low level/low impact aerobic exercise 5x's/wk. Heart healthy diet and risk factor modification.    See labs and med orders.    Aerobic exercise 5x's/wk. Heart healthy diet and risk factor modification.    See labs and med orders.

## 2024-02-01 ENCOUNTER — TELEPHONE (OUTPATIENT)
Dept: CARDIOLOGY | Facility: CLINIC | Age: 74
End: 2024-02-01
Payer: MEDICARE

## 2024-02-01 NOTE — TELEPHONE ENCOUNTER
Message left that [facility] & [facility] are scheduled on Monday, 2/19/24 at [facility] and to arrive at 6 am. Stop Eliquis 3 days prior to procedure as directed by Dr. Palma.

## 2024-02-22 ENCOUNTER — OFFICE VISIT (OUTPATIENT)
Dept: VASCULAR SURGERY | Facility: CLINIC | Age: 74
End: 2024-02-22
Payer: MEDICARE

## 2024-02-22 VITALS
SYSTOLIC BLOOD PRESSURE: 128 MMHG | DIASTOLIC BLOOD PRESSURE: 67 MMHG | BODY MASS INDEX: 29 KG/M2 | WEIGHT: 185.19 LBS | HEART RATE: 84 BPM

## 2024-02-22 DIAGNOSIS — I34.0 NONRHEUMATIC MITRAL VALVE REGURGITATION: Primary | ICD-10-CM

## 2024-02-22 DIAGNOSIS — I48.0 PAROXYSMAL ATRIAL FIBRILLATION: ICD-10-CM

## 2024-02-22 DIAGNOSIS — K08.9 POOR DENTITION: ICD-10-CM

## 2024-02-22 PROCEDURE — 99999 PR PBB SHADOW E&M-EST. PATIENT-LVL II: CPT | Mod: PBBFAC,,, | Performed by: THORACIC SURGERY (CARDIOTHORACIC VASCULAR SURGERY)

## 2024-02-22 PROCEDURE — 99205 OFFICE O/P NEW HI 60 MIN: CPT | Mod: S$PBB,,, | Performed by: THORACIC SURGERY (CARDIOTHORACIC VASCULAR SURGERY)

## 2024-02-22 PROCEDURE — 99212 OFFICE O/P EST SF 10 MIN: CPT | Mod: PBBFAC,PO | Performed by: THORACIC SURGERY (CARDIOTHORACIC VASCULAR SURGERY)

## 2024-02-22 RX ORDER — AMOXICILLIN AND CLAVULANATE POTASSIUM 875; 125 MG/1; MG/1
TABLET, FILM COATED ORAL
Qty: 6 TABLET | Refills: 11 | Status: SHIPPED | OUTPATIENT
Start: 2024-02-22 | End: 2024-04-16

## 2024-02-22 NOTE — PROGRESS NOTES
DATE OF CONSULTATION: 2/22/2024     CONSULT REQUESTED BY:  Dr. Yon Palma     REASON FOR CONSULTATION:  Severe mitral regurgitation, paroxysmal atrial fibrillation     HPI:   The patient is a 74-year-old  female who, about 2 years ago, was found to be in atrial fibrillation during a preoperative visit prior to rotator cuff surgery.    She was referred to Dr. Palma for the atrial fibrillation.  Echocardiography at that time demonstrates left ventricular dysfunction (ejection fraction 35%) and moderate to severe mitral valve regurgitation.  Dr. Palma managed the dilated cardiomyopathy and mitral regurgitation medically (Entresto) and performed GEORGE echo with cardioversion in April 2022.    The patient underwent successful rotator cuff repair in March of 2023.  She continues to follow-up with Dr. Palma.    Follow-up echocardiography 11/28/2023 demonstrates an ejection fraction of 60%.  There is severe left atrial enlargement.  There is moderate to severe mitral valve regurgitation with a posteriorly-directed jet.    Left heart catheterization and right heart catheterization  02/20/2024 demonstrates no significant epicardial coronary artery disease, the LVEDP is 17, and the pulmonary capillary wedge pressure is 18 (PAP 40/18 with a mean of 26).    Cardiac surgical consultation has been requested for mitral valve repair/replacement and the Maze procedure.    The patient visits the office today accompanied by her significant other.      Her primary complaint is progressive generalized fatigue and exertional dyspnea for the past 2 years.  She likes to feed her chickens and this activity has become difficult because of the dyspnea.  When questioned specifically, she denies other symptoms of congestive heart failure-no orthopnea, no lower extremity edema, no paroxysmal nocturnal dyspnea.    The patient describes occasional palpitations but is really unaware of whether she has AFib or not.     Data Review:  In  preparation for this consultation, I have reviewed the patient's entire [facility] medical record.  I have personally reviewed and interpreted images from the echocardiogram and recent left heart catheterization/coronary angiogram.  I have had a brief telephone discussion about this patient with Dr. Stern      Past Medical History:   Diagnosis Date    A-fib     Anticoagulant long-term use     Arthritis     Hyperlipidemia     Pulmonary hypertension         Dilated cardiomyopathy, hyperlipidemia, paroxysmal atrial fibrillation, chronic kidney disease-stage III A (creatinine 1.1), degenerative joint disease, chronic anemia-hematocrit 29%     Past Surgical History:   Procedure Laterality Date    APPENDECTOMY      ARTERIOGRAPHY OF AORTIC ROOT  2/20/2024    Procedure: AO Root;  Surgeon: Yon Palma MD;  Location: [facility] CATH;  Service: Cardiology;;    CATARACT Bilateral     CATHETERIZATION OF BOTH LEFT AND RIGHT HEART  2/20/2024    Procedure: Right / Left heart cath;  Surgeon: Yon Palma MD;  Location: [facility] CATH;  Service: Cardiology;;    COLONOSCOPY      CORONARY ANGIOGRAPHY N/A 05/16/2022    Procedure: ANGIOGRAM, CORONARY ARTERY;  Surgeon: Yon Palma MD;  Location: [facility] CATH;  Service: Cardiology;  Laterality: N/A;    CORONARY ANGIOGRAPHY N/A 2/20/2024    Procedure: ANGIOGRAM, CORONARY ARTERY;  Surgeon: Yon Palma MD;  Location: [facility] CATH;  Service: Cardiology;  Laterality: N/A;    HYSTERECTOMY      LEFT HEART CATHETERIZATION Left 05/16/2022    Procedure: Left heart cath;  Surgeon: Yon Palma MD;  Location: [facility] CATH;  Service: Cardiology;  Laterality: Left;        Rotator cuff repair, appendectomy, hysterectomy, cataract repair, left heart catheterization/coronary angiogram, DCCV     Social History     Socioeconomic History    Marital status:    Tobacco Use    Smoking status: Former     Current packs/day: 0.00     Types: Cigarettes     Start date: 3/18/2010     Quit date: 3/18/2016     Years  since quittin.9    Smokeless tobacco: Never   Substance and Sexual Activity    Alcohol use: Not Currently    Drug use: Yes     Types: Marijuana     Comment: cbd oil    Sexual activity: Not Currently        The patient has a less than 10 pack-year history of tobacco abuse.  She quit smoking cigarettes in 2016.  She does not consume alcohol.  She smokes CBD oil nearly every day.  She is  and has two adult children who live close by and are supportive.  She currently lives with a significant other.  She lives a busy and active lifestyle but does not exercise regularly.  She is a retired  and .  She does not receive regular dental care.  She has no current dental complaints.    I have recommended dental clearance and prescribed antibiotic dental prophylaxis today.  I also spent 15-20 minutes emphasizing the importance of dental hygiene with valvular heart disease.       Allergies:  No known drug allergies      Prior to Admission Meds (Last known outpatient meds at time of note signature)   Prior to Admission medications    Medication Sig Start Date End Date Taking? Authorizing Provider   apixaban (ELIQUIS) 5 mg Tab Take 5 mg by mouth 2 (two) times daily. 23   Provider, Historical   gabapentin (NEURONTIN) 600 MG tablet Take 600 mg by mouth 2 (two) times daily. prn    Provider, Historical   metoprolol succinate (TOPROL-XL) 25 MG 24 hr tablet Take 1 tablet (25 mg total) by mouth once daily. 24   Yon Palma MD   ondansetron (ZOFRAN) 4 MG tablet Take 4 mg by mouth every 8 (eight) hours as needed.    Provider, Historical   sacubitriL-valsartan (ENTRESTO) 24-26 mg per tablet Take 1 tablet by mouth 2 (two) times daily.  Patient taking differently: Take 0.5 tablets by mouth 2 (two) times daily. 7/10/23   Yon Palma MD   sertraline (ZOLOFT) 100 MG tablet Take 100 mg by mouth once daily.    Provider, Historical        Review of Systems:   Constitutional: no fever, no  chills, no appetite change, no unexpected weight change   Dermatological: no jaundice, no rash, no nodules, no ulcers, no pruritis   HEENT: no vision change, no hearing change, no nasal discharge, no sore throat   Neck: no unusual stiffness, no swollen glands, no goiter   Respiratory: (+) dyspnea, no cough, no hemoptysis, no wheezing,  Cardiovascular: no chest pain, (+)palpitations, no edema   Gastrointestinal: no abdominal discomfort   Musculoskeletal: no new joint pain, no joint swelling, no myalgia   : no dysuria, no frequency, no gross hematuria   HEME: no prolonged bleeding, no excessive bruising, no blood clots, no adenopathy   Endocrine: no excessive thirst, no unusual intolerance of heat or cold   Neurological: no confusion, no seizures, no syncope, no falls   Psychological: no anxiety, no depression     Objective:   Vitals:    02/22/24 1356   BP: 128/67   Pulse: 84       Physical Exam:   Constitutional: Alert, appears stated age, cooperative and no distress.  Normal body habitus, no obvious deformities, good attention to grooming.   Head: Normocephalic, without obvious abnormality, atraumatic   Eyes: Conjunctivae/corneas clear. PERRL, EOM's intact. No exudate.  Nose: Nares normal. Septum midline. Mucosa normal. No drainage or sinus tenderness.   Throat: Lips, mucosa, and tongue normal; fair dentition  Neck: No adenopathy, no carotid bruit, no JVD, supple, symmetrical, trachea midline, and thyroid not enlarged, symmetric, no tenderness/mass/nodules.   Back: Symmetric, no curvature ROM normal No CVA tenderness.   Lungs: Clear to auscultation bilaterally and good air exchange. No tachypnea. No use of accessory muscles.   Heart: Regular rate and rhythm, S1, S2 normal, loud systolic murmur, no click, rub or gallop. PMI nondisplaced.   Abdomen: Soft, non-tender, bowel sounds normal; No hepatosplenomegaly. No hernias   Aorta: no evidence of aneurysm   Musculoskeletal: No evidence of kyphosis or scoliosis. Normal  gait. Normal muscle strength and tone. No evidence of limb atrophy or abnormal movements.   Extremities: Normal, atraumatic, no clubbing, cyanosis, or edema. Hair present on pretibial regions. There are no venous varicosities   Pulses: 2+ and symmetric in both radial and femoral regions.   Skin: Skin color, texture, turgor normal.  No rashes or lesions.  Lymph nodes: Cervical, supraclavicular, and axillary nodes normal   Neurologic/psychiatric: Cranial nerves 2-12 are grossly intact No obvious abnormality. Oriented to person, place, and time. No depression, anxiety or agitation.     Assessment:  Patient Active Problem List    Diagnosis Date Noted    Typical atrial flutter 01/31/2024    Carotid artery plaque, bilateral 11/22/2023    Pulmonary hypertension 07/10/2023    Bruit of left carotid artery 07/10/2023    Stage 3a chronic kidney disease 10/19/2022    Other sleep apnea 10/19/2022    Left heart failure 06/15/2022    Abnormal nuclear stress test 04/27/2022    Dilated cardiomyopathy 04/27/2022    Moderate to severe mitral regurgitation 04/27/2022    PAF (paroxysmal atrial fibrillation) 04/27/2022    Long term (current) use of anticoagulants 04/27/2022    Atrial fibrillation with RVR 03/18/2022    SOB (shortness of breath) 03/18/2022    Other fatigue 03/18/2022    Hypercholesterolemia 03/18/2022    Overweight (BMI 25.0-29.9) 03/18/2022          Plan:  The patient has paroxysmal atrial fibrillation for at least the past 2 years.  She also has eccentric, moderate to severe mitral valve regurgitation with left atrial enlargement.    The patient's complaints are that of progressive exertional dyspnea with generalized fatigue.  The cardiomyopathy/congestive heart failure have been well managed medically.    The patient has only mild elevation of her pulmonary artery pressures.    I believe the best treatment option for her will be surgical mitral valve repair/replacement with a Maze procedure to include ligation of the  left atrial appendage.    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  Repair of the anterior leaflet is more difficult and complex than that of the posterior leaflet.    I had a long (> 1 hour) discussion with the patient and her significant other this afternoon in the office.  I reviewed the surgical indications and the sub optimal medical therapeutic alternative treatments with them.  I reviewed the procedure of mitral valve repair/replacement and Maze procedure with them.  Many questions were asked and answered.    I also discussed, in detail, the potential surgical risks and the expected benefits and outcome of MVR/Maze/CAIO Ligation.    I calculated and discussed the 30-day STS risk for morbidity or mortality with them.    Procedure Type: Isolated MVR   PERIOPERATIVE OUTCOME ESTIMATE %   Operative Mortality 3.17%   Morbidity & Mortality 13.2%   Stroke 1.43%   Renal Failure 3.27%   Reoperation 3.88%   Prolonged Ventilation 9.86%   Deep Sternal Wound Infection 0.082%   Long Hospital Stay (>14 days) 16.4%   Short Hospital Stay (<6 days)* 13.4%     The patient is anxious but seems to be relieved that we are moving forward.    I have asked the patient to receive dental clearance prior to scheduling surgical intervention.    I will see her in the office in 3 weeks after dental clearance at which time we will further discuss and schedule MVR/Maze.    Thank you, Dr. Palma, for allowing me to participate in the care of this nice patient.

## 2024-02-28 ENCOUNTER — TELEPHONE (OUTPATIENT)
Dept: CARDIOLOGY | Facility: CLINIC | Age: 74
End: 2024-02-28
Payer: MEDICARE

## 2024-02-28 NOTE — TELEPHONE ENCOUNTER
----- Message from Fouzia Fitzgerald sent at 2/28/2024  9:35 AM CST -----  Regarding: pt called  Name of Who is Calling: KINGA MIRANDA [[id number]]      What is the request in detail: pt is checking the status if paperwork was filled out for her (ENTRESTO) medication that the pharmacy sent over. Please advise       Can the clinic reply by [facility]: No       [facility] Telephone Information:  Mobile          [phone]

## 2024-02-29 ENCOUNTER — DOCUMENTATION ONLY (OUTPATIENT)
Dept: VASCULAR SURGERY | Facility: CLINIC | Age: 74
End: 2024-02-29
Payer: MEDICARE

## 2024-02-29 NOTE — PROGRESS NOTES
Spoke to pt to concerning dental procedures and orthopedic appointment scheduled for next week.     Pt advised to stop Eliquis 2 days prior, along with taking Augmentin 1 hour prior to any dental procedures, form faxed to dentist office. She can have cortisone injections, but she will need to inform her physician that she is taking Eliquis.     Pt informed to contact our office with any questions or concerns.

## 2024-03-13 ENCOUNTER — PATIENT MESSAGE (OUTPATIENT)
Dept: PHARMACY | Facility: CLINIC | Age: 74
End: 2024-03-13
Payer: MEDICARE

## 2024-03-13 NOTE — TELEPHONE ENCOUNTER
The prescription portion of [employer] Porfir[employer] 's Entresto application was received from the providers office. The completed patient assistance application has been submitted to  [employer] for consideration of eligibility.

## 2024-03-14 ENCOUNTER — OFFICE VISIT (OUTPATIENT)
Dept: VASCULAR SURGERY | Facility: CLINIC | Age: 74
End: 2024-03-14
Payer: MEDICARE

## 2024-03-14 VITALS
DIASTOLIC BLOOD PRESSURE: 61 MMHG | BODY MASS INDEX: 27.61 KG/M2 | WEIGHT: 175.94 LBS | HEART RATE: 73 BPM | RESPIRATION RATE: 18 BRPM | HEIGHT: 67 IN | SYSTOLIC BLOOD PRESSURE: 108 MMHG

## 2024-03-14 DIAGNOSIS — I34.0 NONRHEUMATIC MITRAL VALVE REGURGITATION: Primary | ICD-10-CM

## 2024-03-14 DIAGNOSIS — I48.0 PAROXYSMAL ATRIAL FIBRILLATION: ICD-10-CM

## 2024-03-14 DIAGNOSIS — F32.A DEPRESSION, UNSPECIFIED DEPRESSION TYPE: ICD-10-CM

## 2024-03-14 DIAGNOSIS — E11.9 DIABETES: ICD-10-CM

## 2024-03-14 DIAGNOSIS — I05.0 MITRAL VALVE STENOSIS: ICD-10-CM

## 2024-03-14 PROCEDURE — 99999 PR PBB SHADOW E&M-EST. PATIENT-LVL V: CPT | Mod: PBBFAC,,, | Performed by: THORACIC SURGERY (CARDIOTHORACIC VASCULAR SURGERY)

## 2024-03-14 PROCEDURE — 99214 OFFICE O/P EST MOD 30 MIN: CPT | Mod: S$PBB,,, | Performed by: THORACIC SURGERY (CARDIOTHORACIC VASCULAR SURGERY)

## 2024-03-14 PROCEDURE — 99215 OFFICE O/P EST HI 40 MIN: CPT | Mod: PBBFAC,PO | Performed by: THORACIC SURGERY (CARDIOTHORACIC VASCULAR SURGERY)

## 2024-03-14 NOTE — PROGRESS NOTES
"3/14/2024    HISTORY:  I saw this patient in consultation 02/22/2024.  She has severe mitral valve regurgitation and paroxysmal atrial fibrillation.  Her pulmonary artery pressures have been only mildly elevated (40-50).  Her ejection fraction, by echo on 11/28/2023, is 60%(Entresto).  She has no significant epicardial coronary artery disease    In preparation for mitral valve replacement/Maze, the patient has undergone dental clearance.  She has had 3 teeth extracted and 2 teeth filled.    She visits the office today accompanied by her significant other.  She is less spunky" and good-spirited than she was last month.  She has stopped drinking coffee and smoking CBD oil in preparation for her surgery.  She and her significant other believe she is somewhat depressed.  Her primary care provider is considering changing her antidepressive regimen but will do so after she has surgery.    Since her last visit, the patient has had no significant change in her symptoms.  Not drinking coffee or smoking CBD oil, however, has her feeling sluggish.    Today, I reviewed the patient's concurrent medical history, past surgical history, family history, allergies and current medications.    REVIEW OF SYSTEMS:  Constitutional: no fever, no chills, no appetite change, no unexpected weight change  Dermatological:  no jaundice, no rash no nodules, no ulcers, no pruritis  HEENT: no vision change, no hearing change, no nasal discharge, no sore throat  Neck: no unusual stiffness, no swollen glands, no goiter  Respiratory: (+) dyspnea, no cough, no hemoptysis, no wheezing  Cardiovascular: no chest pain, no recent palpitations, no edema  Gastrointestinal: no abdominal discomfort  Musculoskeletal: no new joint, no joint swelling, no mylagia  : no dysuria, no frequency, no gross hematuria  HEME: no prolonged bleeding, no excessive bruising, no blood clots, no adenopathy  Endocrine: no excessive thirst, no unusual intolerance of heat and " cold  Neurological: no confusion, no seizures, no syncope, no falls  Psychological: no anxiety, no depression    Prior to Admission medications    Medication Sig Start Date End Date Taking? Authorizing Provider   apixaban (ELIQUIS) 5 mg Tab Take 5 mg by mouth 2 (two) times daily. 8/17/23  Yes Provider, Historical   gabapentin (NEURONTIN) 600 MG tablet Take 600 mg by mouth 2 (two) times daily. prn   Yes Provider, Historical   metoprolol succinate (TOPROL-XL) 25 MG 24 hr tablet Take 1 tablet (25 mg total) by mouth once daily. 1/31/24  Yes Yon Palma MD   ondansetron (ZOFRAN) 4 MG tablet Take 4 mg by mouth every 8 (eight) hours as needed.   Yes Provider, Historical   sacubitriL-valsartan (ENTRESTO) 24-26 mg per tablet Take 1 tablet by mouth 2 (two) times daily.  Patient taking differently: Take 0.5 tablets by mouth 2 (two) times daily. 7/10/23  Yes Yon Palma MD   sertraline (ZOLOFT) 100 MG tablet Take 100 mg by mouth once daily.   Yes Provider, Historical   amoxicillin-clavulanate 875-125mg (AUGMENTIN) 875-125 mg per tablet Take iii tablets by mouth one hour prior to your dental appointment  Patient not taking: Reported on 3/14/2024 2/22/24   Laura Boyd MD                Objective:     Vitals:    03/14/24 1146   BP: 108/61   Pulse: 73   Resp: 18         PHYSICAL EXAM:  Constitutional: Alert, appears stated age, cooperative and no distress.  Normal body habitus, no obvious deformities, good attention to grooming.  Head: Normocephalic, without obvious abnormality, atraumatic.  Eyes: Conjunctive/corneas clear. PERRL, EOM intact, Fundi benign. No exudate.  Nose:  Nares normal, septum midline, mucosa normal, no drainage or sinus tenderness.  Throat: Lips mucosa and tongue normal; fair dentition  Neck: No edema, no carotid bruit, no JVD. supple symmetrical trachea midline and thyroid not enlarged symmetric no tenderness mass nodules  Back: Symmetric, no curvature.  ROM normal.  No CVA tenderness.  Lungs: Clear  to auscultation bilaterally and god air exchange.  No tachypnea.  No use of accessory muscles.    Heart: Regular rate and rhythm.  S1, S2 normal, no murmur, click rub or gallop. PMI nondisplaced.    Abdomen: Soft, non tender, bowel sounds normal  Aorta: no evidence of aneurysm   Musculoskeletal: No evidence of kyphosis or scoliosis. Normal gait. Normal muscle strength and tone. No evidence of limb atrophy or abnormal movements.   Extremities: Normal, atraumatic, no clubbing, cyanosis, or edema. Hair present on pretibial regions. There are no venous varicosities   Pulses: 2+ and symmetric in both radial and femoral regions.   Skin: Skin color, texture, turgor normal.  No rashes or lesions.  Lymph nodes: Cervical, supraclavicular, and axillary nodes normal   Neurologic/psychiatric: Cranial nerves 2-12 are grossly intact No obvious abnormality. Oriented to person, place, and time. No depression, anxiety or agitation.    PLAN:    I had a long (nearly 1 hour) discussion with the patient and her significant other today I re-reviewed, step-by-step, the steps and events that will occur the day of her surgery.  I also reviewed each day of her expected recovery.    I also reviewed, again, the potential surgical risk and the calculated STS risk.    The patient and her significant other had several lingering questions.  These were asked and answered.    The patient's last dose of Eliquis and Entresto will be 03/20/2024.    The patient will undergo routine preoperative evaluation.    She has chosen 03/26/2024 for her operative date.  We plan mitral valve replacement, Maze procedure, ligation of the left atrial appendage, [place] catheter insertion, and femoral arterial line insertion.  We will also use intraoperative transesophageal echocardiography.

## 2024-03-15 RX ORDER — CEFAZOLIN SODIUM 2 G/50ML
2 SOLUTION INTRAVENOUS
Status: CANCELLED | OUTPATIENT
Start: 2024-03-15

## 2024-03-15 RX ORDER — CHLORHEXIDINE GLUCONATE ORAL RINSE 1.2 MG/ML
10 SOLUTION DENTAL
Status: CANCELLED | OUTPATIENT
Start: 2024-03-15

## 2024-03-15 RX ORDER — MUPIROCIN 20 MG/G
OINTMENT TOPICAL
Status: CANCELLED | OUTPATIENT
Start: 2024-03-15

## 2024-03-18 ENCOUNTER — TELEPHONE (OUTPATIENT)
Dept: VASCULAR SURGERY | Facility: CLINIC | Age: 74
End: 2024-03-18
Payer: MEDICARE

## 2024-03-18 NOTE — TELEPHONE ENCOUNTER
"Pt informed of Dr James's recommendations, verbalized understanding, advised to contact office with any further questions or concerns or ED.      ----- Message from Yon Palma MD sent at 3/18/2024 12:54 PM CDT -----  Regarding: RE: Fainting spells  Hold Entresto and increase oral intake  ----- Message -----  From: Mague Escudero RN  Sent: 3/18/2024  12:38 PM CDT  To: Yon Palma MD  Subject: Fainting spells                                  Good morning -   Pt has been having "spells" (as she calls them), dizziness, loss of balance, and falling, she has hit her head twice, once she loss conscious. This has been happening daily since last Thursday. She has been taking her BP ranging from 80/40 - 110/70 - on the higher range she is not having these events. Please advise any recommendations or if you would like to refer her to her PCP.   Respectfully,   DIETER Bowser         "

## 2024-03-22 ENCOUNTER — TELEPHONE (OUTPATIENT)
Dept: VASCULAR SURGERY | Facility: CLINIC | Age: 74
End: 2024-03-22
Payer: MEDICARE

## 2024-03-23 ENCOUNTER — HOSPITAL ENCOUNTER (OUTPATIENT)
Dept: RADIOLOGY | Facility: HOSPITAL | Age: 74
Discharge: HOME | End: 2024-03-23
Attending: THORACIC SURGERY (CARDIOTHORACIC VASCULAR SURGERY)
Payer: MEDICARE

## 2024-03-23 DIAGNOSIS — Z01.818 ENCOUNTER FOR OTHER PREPROCEDURAL EXAMINATION: ICD-10-CM

## 2024-03-23 PROCEDURE — 71250 CT THORAX DX C-: CPT | Mod: TC,PO

## 2024-03-23 PROCEDURE — 71250 CT THORAX DX C-: CPT | Mod: 26,,, | Performed by: RADIOLOGY

## 2024-03-25 ENCOUNTER — HOSPITAL ENCOUNTER (OUTPATIENT)
Dept: CARDIOLOGY | Facility: HOSPITAL | Age: 74
Discharge: HOME | End: 2024-03-25
Attending: THORACIC SURGERY (CARDIOTHORACIC VASCULAR SURGERY)
Payer: MEDICARE

## 2024-03-25 VITALS — HEIGHT: 67 IN | BODY MASS INDEX: 27.47 KG/M2 | WEIGHT: 175 LBS

## 2024-03-25 DIAGNOSIS — I34.0 NONRHEUMATIC MITRAL VALVE REGURGITATION: ICD-10-CM

## 2024-03-25 LAB
ASCENDING AORTA: 3.06 CM
AV INDEX (PROSTH): 0.77
AV MEAN GRADIENT: 6 MMHG
AV PEAK GRADIENT: 11 MMHG
AV VALVE AREA BY VELOCITY RATIO: 2.5 CM²
AV VALVE AREA: 2.33 CM²
AV VELOCITY RATIO: 0.83
BSA FOR ECHO PROCEDURE: 1.94 M2
CV ECHO LV RWT: 0.38 CM
DOP CALC AO PEAK VEL: 1.64 M/S
DOP CALC AO VTI: 38.9 CM
DOP CALC LVOT AREA: 3 CM2
DOP CALC LVOT DIAMETER: 1.96 CM
DOP CALC LVOT PEAK VEL: 1.36 M/S
DOP CALC LVOT STROKE VOLUME: 90.77 CM3
DOP CALCLVOT PEAK VEL VTI: 30.1 CM
E WAVE DECELERATION TIME: 223.72 MSEC
E/A RATIO: 1.57
E/E' RATIO: 7.2 M/S
ECHO LV POSTERIOR WALL: 1.01 CM (ref 0.6–1.1)
EJECTION FRACTION: 60 %
FRACTIONAL SHORTENING: 31 % (ref 28–44)
INTERVENTRICULAR SEPTUM: 1.04 CM (ref 0.6–1.1)
IVRT: 95.15 MSEC
LEFT ATRIUM SIZE: 4.66 CM
LEFT ATRIUM VOLUME INDEX MOD: 66.6 ML/M2
LEFT ATRIUM VOLUME MOD: 127.25 CM3
LEFT INTERNAL DIMENSION IN SYSTOLE: 3.67 CM (ref 2.1–4)
LEFT VENTRICLE DIASTOLIC VOLUME INDEX: 72.45 ML/M2
LEFT VENTRICLE DIASTOLIC VOLUME: 138.37 ML
LEFT VENTRICLE MASS INDEX: 110 G/M2
LEFT VENTRICLE SYSTOLIC VOLUME INDEX: 29.8 ML/M2
LEFT VENTRICLE SYSTOLIC VOLUME: 56.97 ML
LEFT VENTRICULAR INTERNAL DIMENSION IN DIASTOLE: 5.35 CM (ref 3.5–6)
LEFT VENTRICULAR MASS: 210.34 G
LV LATERAL E/E' RATIO: 6.55 M/S
LV SEPTAL E/E' RATIO: 8 M/S
LVOT MG: 3.88 MMHG
LVOT MV: 0.92 CM/S
MV PEAK A VEL: 0.46 M/S
MV PEAK E VEL: 0.72 M/S
MV STENOSIS PRESSURE HALF TIME: 64.88 MS
MV VALVE AREA P 1/2 METHOD: 3.39 CM2
PISA MRMAX VEL: 5.37 M/S
PISA TR MAX VEL: 3.07 M/S
PULM VEIN S/D RATIO: 1.24
PV PEAK D VEL: 0.71 M/S
PV PEAK S VEL: 0.88 M/S
RA PRESSURE ESTIMATED: 3 MMHG
RA VOL SYS: 34.01 ML
RIGHT ATRIAL AREA: 14.8 CM2
RIGHT VENTRICULAR END-DIASTOLIC DIMENSION: 4.06 CM
RIGHT VENTRICULAR LENGTH IN DIASTOLE (APICAL 4-CHAMBER VIEW): 7.31 CM
RV MID DIAMA: 2.54 CM
RV TB RVSP: 6 MMHG
RV TISSUE DOPPLER FREE WALL SYSTOLIC VELOCITY 1 (APICAL 4 CHAMBER VIEW): 15.91 CM/S
SINUS: 2.89 CM
STJ: 2.66 CM
TDI LATERAL: 0.11 M/S
TDI SEPTAL: 0.09 M/S
TDI: 0.1 M/S
TR MAX PG: 38 MMHG
TRICUSPID ANNULAR PLANE SYSTOLIC EXCURSION: 2.24 CM
TV REST PULMONARY ARTERY PRESSURE: 41 MMHG
Z-SCORE OF LEFT VENTRICULAR DIMENSION IN END DIASTOLE: -0.05
Z-SCORE OF LEFT VENTRICULAR DIMENSION IN END SYSTOLE: 0.82

## 2024-03-25 PROCEDURE — 93306 TTE W/DOPPLER COMPLETE: CPT | Mod: 26,,, | Performed by: INTERNAL MEDICINE

## 2024-03-25 PROCEDURE — 93306 TTE W/DOPPLER COMPLETE: CPT | Mod: PO

## 2024-04-01 NOTE — TELEPHONE ENCOUNTER
We are pleased to inform you Veronica Monroy has been approved in the [employer] Patient Assistance Program.  [employer] has shared this information with your patient.   Approval Details  Eligibility start Date: 03/27/2024  Eligibility end date: 12/31/2024 (Updated proof of eligibility required to re-enroll for 2025 calendar year).  Approved Medication: Entresto  Day supply: 90  Allotted Refills: 3 (Please be advised Program allows only 3 refills per eligibility period regardless of changes in strength).   Estimated Shipping: 10-14 business days.  Shipping Location: Patient's Home (You and your patient will receive further communication from an [facility] Rep once Medication is received)                                            Important Note  It is imperative that any changes to strength or discontinuation of this medication be referred to the Pharmacy Patient Assistance Team Pool Via [employer] to prevent Gaps in therapy.

## 2024-04-04 DIAGNOSIS — R45.89 ANXIETY ABOUT HEALTH: Primary | ICD-10-CM

## 2024-04-04 RX ORDER — ALPRAZOLAM 0.25 MG/1
0.25 TABLET ORAL 3 TIMES DAILY PRN
Qty: 10 TABLET | Refills: 0 | Status: ON HOLD | OUTPATIENT
Start: 2024-04-04 | End: 2024-04-14 | Stop reason: HOSPADM

## 2024-04-08 PROBLEM — Z98.890 HISTORY OF MAZE PROCEDURE: Status: ACTIVE | Noted: 2024-04-08

## 2024-04-08 PROBLEM — Z95.2 HISTORY OF MITRAL VALVE REPLACEMENT: Status: ACTIVE | Noted: 2024-04-08

## 2024-04-11 DIAGNOSIS — Z95.1 S/P CABG (CORONARY ARTERY BYPASS GRAFT): Primary | ICD-10-CM

## 2024-04-12 PROBLEM — Z95.2 S/P MITRAL VALVE REPLACEMENT: Status: ACTIVE | Noted: 2024-04-12

## 2024-04-15 ENCOUNTER — TELEPHONE (OUTPATIENT)
Dept: VASCULAR SURGERY | Facility: CLINIC | Age: 74
End: 2024-04-15
Payer: MEDICARE

## 2024-04-15 PROBLEM — I48.91 ATRIAL FIBRILLATION: Status: ACTIVE | Noted: 2024-04-15

## 2024-04-15 NOTE — TELEPHONE ENCOUNTER
Clarified wound/incisional care and instruction - remove dressing and keep clean and dry. Call with redness, opening or drainage.  nurse verbalized understanding.     ----- Message from Giovanni Vargas sent at 4/15/2024  1:38 PM CDT -----  Type: Needs Medical Advice    Who Called:  Diana from [facility]    Best Call Back Number: [phone]    Additional Information: Diana from [facility] is calling to verify directions on the dressing of pt's wounds. She is currently at pt's house. Please call back to advise, Thanks!

## 2024-04-18 ENCOUNTER — TELEPHONE (OUTPATIENT)
Dept: VASCULAR SURGERY | Facility: CLINIC | Age: 74
End: 2024-04-18
Payer: MEDICARE

## 2024-04-18 NOTE — TELEPHONE ENCOUNTER
Per Dr Boyd - Stop the Lasix and Toprol for 3 days and drink fluids. As far as the bra - she can take brakes as needed but wear the bra more than not. Please call me at [phone] if you have questions.     ----- Message from Diana Kincaid RN sent at 4/18/2024  1:02 PM CDT -----  Regarding: Bra and hypotension.    Pt would like to know when she can take breaks fron bra.  Also pt has been mostly hypotensive 100/56 now after Lasix 40mg In the 90s sys in the pm and pt feeling washed out. Also taking Toprol at H[facility].  Please advise

## 2024-04-22 ENCOUNTER — TELEPHONE (OUTPATIENT)
Dept: VASCULAR SURGERY | Facility: CLINIC | Age: 74
End: 2024-04-22
Payer: MEDICARE

## 2024-04-22 NOTE — TELEPHONE ENCOUNTER
Informed ED staff and physician Dr Boyd currently in surgery at [facility] - stated they will call her there.     ----- Message from Thi Mary sent at 4/22/2024 12:20 PM CDT -----  Regarding: call from dr. Reynolds  Contact: dr reynolds  Type:  Needs Medical Advice    Who Called: jackson from [facility]    Best Call Back Number:  please call  [phone]    Additional Information: please call dr Reynolds would like to speak to the doctor re: this pt.   Please advise.  Thank you.

## 2024-04-29 ENCOUNTER — TELEPHONE (OUTPATIENT)
Dept: VASCULAR SURGERY | Facility: CLINIC | Age: 74
End: 2024-04-29
Payer: MEDICARE

## 2024-04-29 NOTE — TELEPHONE ENCOUNTER
Per Dr Boyd: We can hold the Lasix for 3days. HR 51-75 is OK - informed HH nurse - verbalized understanding - will also hold potassium for 3 days.     ----- Message from Mariela Staton LPN sent at 4/29/2024  1:59 PM CDT -----    ----- Message -----  From: Karen Ellison  Sent: 4/29/2024   1:51 PM CDT  To: Deb Sanchez Staff    Type: Needs Medical Advice  Who Called:  Eugenia with [facility]  Best Call Back Number: [phone]  Additional Information: Eugenia is calling in regards to the pt having low blood pressure (88//80) Heart rate 51-75 before medication. Pt is taking  furosemide (LASIX) 40 MG tablet in the morning and metoprolol succinate (TOPROL-XL) 25 MG 24 hr tablet at night along with  amiodarone (PACERONE) 200 MG Tab twice a day. Pt's blood pressure is 100/58 today and her pulse is 58. Needs to speak to a nurse in the office. Please call back and advise. Thanks!

## 2024-05-02 ENCOUNTER — HOSPITAL ENCOUNTER (OUTPATIENT)
Dept: RADIOLOGY | Facility: HOSPITAL | Age: 74
Discharge: HOME | End: 2024-05-02
Attending: THORACIC SURGERY (CARDIOTHORACIC VASCULAR SURGERY)
Payer: MEDICARE

## 2024-05-02 ENCOUNTER — OFFICE VISIT (OUTPATIENT)
Dept: VASCULAR SURGERY | Facility: CLINIC | Age: 74
End: 2024-05-02
Payer: MEDICARE

## 2024-05-02 VITALS
SYSTOLIC BLOOD PRESSURE: 119 MMHG | BODY MASS INDEX: 29.76 KG/M2 | HEART RATE: 66 BPM | HEIGHT: 66 IN | DIASTOLIC BLOOD PRESSURE: 65 MMHG | WEIGHT: 185.19 LBS

## 2024-05-02 DIAGNOSIS — Z98.890 HISTORY OF MAZE PROCEDURE: ICD-10-CM

## 2024-05-02 DIAGNOSIS — Z95.2 HISTORY OF MITRAL VALVE REPLACEMENT: Primary | ICD-10-CM

## 2024-05-02 DIAGNOSIS — Z95.3 S/P MITRAL VALVE REPLACEMENT WITH BIOPROSTHETIC VALVE: ICD-10-CM

## 2024-05-02 PROCEDURE — 71046 X-RAY EXAM CHEST 2 VIEWS: CPT | Mod: 26,,, | Performed by: RADIOLOGY

## 2024-05-02 PROCEDURE — 71046 X-RAY EXAM CHEST 2 VIEWS: CPT | Mod: TC,FY,PO

## 2024-05-02 PROCEDURE — 99999 PR PBB SHADOW E&M-EST. PATIENT-LVL II: CPT | Mod: PBBFAC,,, | Performed by: THORACIC SURGERY (CARDIOTHORACIC VASCULAR SURGERY)

## 2024-05-02 PROCEDURE — 99024 POSTOP FOLLOW-UP VISIT: CPT | Mod: POP,,, | Performed by: THORACIC SURGERY (CARDIOTHORACIC VASCULAR SURGERY)

## 2024-05-02 PROCEDURE — 99212 OFFICE O/P EST SF 10 MIN: CPT | Mod: PBBFAC,25,PO | Performed by: THORACIC SURGERY (CARDIOTHORACIC VASCULAR SURGERY)

## 2024-05-02 NOTE — PROGRESS NOTES
5/2/2024...    The patient is status post mitral valve replacement (29 mm [employer] Mosaic porcine bioprosthesis)/modified epicardial Maze procedure/ligation of the left atrial appendage on 04/08/2024.    The patient is postoperative course was smooth and fairly progressive.  She did, however, suffer with atrial fibrillation requiring the initiation of amiodarone therapy.  She was discharged home on the 6th postoperative day.    Eight days following her discharge, she was seen in the emergency department with complaints of fatigue and dyspnea.  She was found to be in atrial fibrillation with a heart rate of 111.  She was administered IV fluid and discharged home.    The patient has been on Coumadin therapy and will be for 90 days postoperative.  Her most recent INRs have been therapeutic.    The patient visits the office today for her first postoperative visit.  She looks terrific!     The patient says she has noticed significant improvement in her exercise tolerance.  She only has mild exertional dyspnea when climbing a flight of stairs.    The patient has been compliant with the discharge medications.  We have stopped the Lasix and potassium for the past 3 days and she is feeling better.  Her blood pressure is better    Today, she is in normal sinus rhythm.  She remains on amiodarone twice daily.  I have encouraged her to continue the amiodarone.    On physical exam, breath sounds are clear and equal bilaterally.  She is in a regular rate and rhythm.  There is no audible heart murmur.  The sternotomy wound is healing nicely-I removed the skin staples today.  The chest tube sites are sealed.  She has no lower extremity edema.    CXR today is clear.  Notably, her heart size is much smaller than on the discharge CXR.    Continue amiodarone and Coumadin-she may benefit from Holter monitoring prior to stopping the Coumadin.  Lasix, prn only  Follow-up in 6-8 weeks with a preclinic ECHO  OK to initiate cardiac rehab

## 2024-05-08 DIAGNOSIS — Z95.2 HISTORY OF MITRAL VALVE REPLACEMENT: Primary | ICD-10-CM

## 2024-05-29 ENCOUNTER — OFFICE VISIT (OUTPATIENT)
Dept: CARDIOLOGY | Facility: CLINIC | Age: 74
End: 2024-05-29
Payer: MEDICARE

## 2024-05-29 VITALS
WEIGHT: 188.69 LBS | BODY MASS INDEX: 30.33 KG/M2 | HEIGHT: 66 IN | SYSTOLIC BLOOD PRESSURE: 109 MMHG | HEART RATE: 55 BPM | DIASTOLIC BLOOD PRESSURE: 58 MMHG

## 2024-05-29 DIAGNOSIS — Z95.2 S/P MITRAL VALVE REPLACEMENT: ICD-10-CM

## 2024-05-29 DIAGNOSIS — I27.20 PULMONARY HYPERTENSION: Chronic | ICD-10-CM

## 2024-05-29 DIAGNOSIS — I50.1 LEFT HEART FAILURE: Primary | Chronic | ICD-10-CM

## 2024-05-29 DIAGNOSIS — Z79.899 LONG TERM CURRENT USE OF AMIODARONE: Chronic | ICD-10-CM

## 2024-05-29 DIAGNOSIS — I51.7 LAE (LEFT ATRIAL ENLARGEMENT): Chronic | ICD-10-CM

## 2024-05-29 DIAGNOSIS — E66.3 OVERWEIGHT (BMI 25.0-29.9): Chronic | ICD-10-CM

## 2024-05-29 DIAGNOSIS — N18.31 STAGE 3A CHRONIC KIDNEY DISEASE: Chronic | ICD-10-CM

## 2024-05-29 DIAGNOSIS — I48.0 PAF (PAROXYSMAL ATRIAL FIBRILLATION): Chronic | ICD-10-CM

## 2024-05-29 PROCEDURE — 99214 OFFICE O/P EST MOD 30 MIN: CPT | Mod: S$GLB,,, | Performed by: INTERNAL MEDICINE

## 2024-05-29 RX ORDER — AMIODARONE HYDROCHLORIDE 200 MG/1
100 TABLET ORAL DAILY
Qty: 15 TABLET | Refills: 2 | Status: SHIPPED | OUTPATIENT
Start: 2024-05-29

## 2024-05-29 RX ORDER — GABAPENTIN 600 MG/1
300 TABLET ORAL DAILY
COMMUNITY

## 2024-05-29 NOTE — PROGRESS NOTES
Subjective:    Patient ID:  Veronica Monroy is a 74 y.o. female who presents for Moderate to severe mitral regurgitation 11/22/2023 Dilated , Atrial Fibrillation, Congestive Heart Failure, Heart Problem, and Valvular Heart Disease        HPI  STATUS POST MITRAL VALVE REPLACEMENT AND MAZE PROCEDURE, RECORDS REVIEWED, ON AMIODARONE FOR AFIB, HAD ER VISIT WITH PAROXYSMAL ATRIAL FIBRILLATION , DOING MUCH BETTER, BREATHING BETTER, LESS FATIGUE, BUT STILL SOME, HAS BEEN OFF MOST MEDICATIONS, SEE ROS    Past Medical History:   Diagnosis Date    A-fib     Anticoagulant long-term use     Arthritis     CKD stage 3a, GFR 45-59 ml/min     Hyperlipidemia     Mitral valve stenosis     Pulmonary hypertension      Past Surgical History:   Procedure Laterality Date    ABLATION AND RECONSTRUCTION, CARDIAC ATRIUM, EXTENSIVE, WITH CARDIOPULMONARY BYPASS N/A 4/8/2024    Procedure: ABLATION AND RECONSTRUCTION, CARDIAC ATRIUM, EXTENSIVE, WITH CARDIOPULMONARY BYPASS;  Surgeon: Laura Boyd MD;  Location: [facility] OR;  Service: Cardiothoracic;  Laterality: N/A;    APPENDECTOMY      ARTERIOGRAPHY OF AORTIC ROOT  02/20/2024    Procedure: AO Root;  Surgeon: Yon Palma MD;  Location: [facility] CATH;  Service: Cardiology;;    CATARACT Bilateral     CATHETERIZATION OF BOTH LEFT AND RIGHT HEART  02/20/2024    Procedure: Right / Left heart cath;  Surgeon: Yon Palma MD;  Location: [facility] CATH;  Service: Cardiology;;    COLONOSCOPY      CORONARY ANGIOGRAPHY N/A 05/16/2022    Procedure: ANGIOGRAM, CORONARY ARTERY;  Surgeon: Yon Palma MD;  Location: [facility] CATH;  Service: Cardiology;  Laterality: N/A;    CORONARY ANGIOGRAPHY N/A 02/20/2024    Procedure: ANGIOGRAM, CORONARY ARTERY;  Surgeon: Yno Palma MD;  Location: [facility] CATH;  Service: Cardiology;  Laterality: N/A;    EXCLUSION, LEFT ATRIAL APPENDAGE, OPEN, AS PART OF OPEN CHEST SURGERY Left 4/8/2024    Procedure: EXCLUSION, LEFT ATRIAL APPENDAGE, OPEN, AS PART OF OPEN CHEST SURGERY;  Surgeon:  Laura Boyd MD;  Location: [facility] OR;  Service: Cardiothoracic;  Laterality: Left;    HYSTERECTOMY      LEFT HEART CATHETERIZATION Left 2022    Procedure: Left heart cath;  Surgeon: Yon Palma MD;  Location: [facility] CATH;  Service: Cardiology;  Laterality: Left;    MITRAL VALVE REPLACEMENT N/A 2024    Procedure: REPLACEMENT, MITRAL VALVE;  Surgeon: Laura Boyd MD;  Location: [facility] OR;  Service: Cardiothoracic;  Laterality: N/A;    ROTATOR CUFF REPAIR Left 2023    TRANSESOPHAGEAL ECHOCARDIOGRAPHY N/A 2024    Procedure: ECHOCARDIOGRAM, TRANSESOPHAGEAL;  Surgeon: Laura Boyd MD;  Location: [facility] OR;  Service: Cardiothoracic;  Laterality: N/A;     Family History   Problem Relation Name Age of Onset    Heart disease Mother      Arrhythmia Mother       Social History     Socioeconomic History    Marital status:    Tobacco Use    Smoking status: Former     Current packs/day: 0.00     Types: Cigarettes     Start date: 3/18/2010     Quit date: 3/18/2016     Years since quittin.2    Smokeless tobacco: Never   Substance and Sexual Activity    Alcohol use: Not Currently    Drug use: Not Currently     Types: Marijuana     Comment: cbd oil, quit    Sexual activity: Not Currently     Social Determinants of Health     Financial Resource Strain: Low Risk  (2023)    [facility] [employer] [facility] [facility]    Overall Financial Resource Strain (CARDIA)     Difficulty of Paying Living Expenses: Not hard at all   Food Insecurity: No Food Insecurity (2024)    Hunger Vital Sign     Worried About Running Out of Food in the Last Year: Never true     Ran Out of Food in the Last Year: Never true   Transportation Needs: No Transportation Needs (2024)    OASIS : Transportation     Lack of Transportation (Medical): No     Lack of Transportation  (Non-Medical): No     Patient Unable or Declines to Respond: No   Stress: No Stress Concern Present (7/2/2023)    [facility] from [facility], [facility]    [facility] - Occupational Stress Questionnaire     Feeling of Stress : Not at all   Housing Stability: Low Risk  (4/9/2024)    Housing Stability Vital Sign     Unable to Pay for Housing in the Last Year: No     Number of Places Lived in the Last Year: 1     Unstable Housing in the Last Year: No       Review of patient's allergies indicates:  No Known Allergies    Current Outpatient Medications:     aspirin (ECOTRIN) 81 MG EC tablet, Take 1 tablet (81 mg total) by mouth once daily., Disp: 360 tablet, Rfl: 0    gabapentin (NEURONTIN) 600 MG tablet, Take 300 mg by mouth once daily., Disp: , Rfl:     metoprolol succinate (TOPROL-XL) 25 MG 24 hr tablet, Take 1 tablet (25 mg total) by mouth once daily., Disp: 90 tablet, Rfl: 1    ondansetron (ZOFRAN) 4 MG tablet, Take 4 mg by mouth every 8 (eight) hours as needed., Disp: , Rfl:     oxyCODONE-acetaminophen (PERCOCET) 5-325 mg per tablet, Take 1 tablet by mouth every 6 (six) hours as needed for Pain., Disp: 28 tablet, Rfl: 0    sertraline (ZOLOFT) 100 MG tablet, Take 100 mg by mouth once daily. Indications: anxiousness associated with depression, Disp: , Rfl:     warfarin (COUMADIN) 1 MG tablet, Take 1 mg by mouth Daily. 5/2/24 COUMADIN 1.5 MG ( 1 1/2 OF 1 MG TAB) PO  5/3/24 COUMADIN 0.5MG ( 1/2 OF 1MG TAB) PO 5/4/24 COUMADIN 1MG ( 1 0F 1 MG TAB) PO 5/5/24 COUMADIN 1MG ( 1 0F 1 MG TAB) PO 5/6/24 COUMADIN 0.5MG ( 1/2 OF 1MG TAB) PO 5/7/24 COUMADIN 1MG ( 1 0F 1 MG TAB) PO 5/8/24 COUMADIN 1MG ( 1 0F 1 MG TAB) PO 5/9/24 REPEAT PT/INR    Indications: MVR, Disp: , Rfl:     amiodarone (PACERONE) 200 MG Tab, Take 0.5 tablets (100 mg total) by mouth once  "daily., Disp: 15 tablet, Rfl: 2    Review of Systems   Constitutional: Positive for malaise/fatigue. Negative for chills, decreased appetite, diaphoresis and fever.   HENT: Negative.     Eyes: Negative.    Cardiovascular:  Negative for chest pain, claudication, cyanosis, dyspnea on exertion, irregular heartbeat and leg swelling.   Respiratory: Negative.     Endocrine: Negative.    Musculoskeletal:  Positive for arthritis. Negative for falls.   Gastrointestinal: Negative.    Genitourinary: Negative.    Neurological:  Negative for brief paralysis and focal weakness.   Psychiatric/Behavioral:  Negative for altered mental status and depression.         Objective:      Vitals:    05/29/24 0945   BP: (!) 109/58   Pulse: (!) 55   Weight: 85.6 kg (188 lb 11.4 oz)   Height: 5' 6" (1.676 m)   PainSc:   2   PainLoc: Chest     Body mass index is 30.46 kg/m².    Physical Exam  Constitutional:       Appearance: Normal appearance. She is overweight.   HENT:      Head: Normocephalic and atraumatic.   Eyes:      Extraocular Movements: Extraocular movements intact.      Conjunctiva/sclera: Conjunctivae normal.   Neck:      Vascular: No JVD.   Cardiovascular:      Rate and Rhythm: Regular rhythm. Bradycardia present. No extrasystoles are present.     Pulses:           Carotid pulses are 2+ on the right side and 2+ on the left side.       Radial pulses are 2+ on the right side and 2+ on the left side.        Posterior tibial pulses are 2+ on the right side and 2+ on the left side.      Heart sounds: Murmur heard.      Systolic murmur is present with a grade of 1/6 at the upper right sternal border.      No friction rub. No gallop.   Pulmonary:      Effort: Pulmonary effort is normal. No respiratory distress.      Breath sounds: Normal breath sounds. No rales.   Abdominal:      Palpations: Abdomen is soft.      Tenderness: There is no abdominal tenderness.   Musculoskeletal:      Cervical back: Neck supple.      Right lower leg: No " edema.      Left lower leg: No edema.   Skin:     General: Skin is warm and dry.      Capillary Refill: Capillary refill takes less than 2 seconds.   Neurological:      General: No focal deficit present.      Mental Status: She is alert and oriented to person, place, and time.   Psychiatric:         Mood and Affect: Mood normal.         Speech: Speech normal.         Behavior: Behavior normal.                 ..    Chemistry        Component Value Date/Time     04/22/2024 1003    K 4.3 04/22/2024 1003     04/22/2024 1003    CO2 29 04/22/2024 1003    BUN 14 04/22/2024 1003    CREATININE 0.88 04/22/2024 1003     04/22/2024 1003        Component Value Date/Time    CALCIUM 8.9 04/22/2024 1003    ALKPHOS 102 04/22/2024 1003    AST 48 (H) 04/22/2024 1003    ALT 63 (H) 04/22/2024 1003    BILITOT 0.7 04/22/2024 1003    ESTGFRAFRICA CANCELED 04/09/2024 0324    EGFRNONAA CANCELED 04/09/2024 0324            ..  Lab Results   Component Value Date    CHOL 166 07/02/2023    CHOL 171 03/18/2022     Lab Results   Component Value Date    HDL 51 07/02/2023    HDL 63 03/18/2022     Lab Results   Component Value Date    LDLCALC 97 07/02/2023    LDLCALC 92.6 03/18/2022     Lab Results   Component Value Date    TRIG 90 07/02/2023    TRIG 77 03/18/2022     Lab Results   Component Value Date    CHOLHDL 36.8 03/18/2022     ..  Lab Results   Component Value Date    WBC 6.30 04/22/2024    HGB 9.5 (L) 04/22/2024    HCT 28.9 (L) 04/22/2024     (H) 04/22/2024     04/22/2024       Test(s) Reviewed  I have reviewed the following in detail:  [] Stress test   [] Angiography   [] Echocardiogram   [x] Labs   [x] Other:       Assessment:         ICD-10-CM ICD-9-CM   1. Left heart failure  I50.1 428.1   2. Long term current use of amiodarone  Z79.899 V58.69   3. Pulmonary hypertension  I27.20 416.8   4. PAF (paroxysmal atrial fibrillation)  I48.0 427.31   5. S/P mitral valve replacement  Z95.2 V43.3   6. Overweight (BMI  25.0-29.9)  E66.3 278.02   7. Stage 3a chronic kidney disease  N18.31 585.3   8. LAE (left atrial enlargement)  I51.7 429.3     Problem List Items Addressed This Visit          Cardiac/Vascular    PAF (paroxysmal atrial fibrillation)    Left heart failure - Primary    Relevant Orders    BNP    Pulmonary hypertension    S/P mitral valve replacement       Renal/    Stage 3a chronic kidney disease    Relevant Orders    Comprehensive Metabolic Panel       Endocrine    Overweight (BMI 25.0-29.9)     Other Visit Diagnoses       Long term current use of amiodarone  (Chronic)       Relevant Orders    Comprehensive Metabolic Panel    TSH    LAE (left atrial enlargement)  (Chronic)                Plan:     DECREASE AMIODARONE  MG, ECHO IN AM, CLINICALLY DOING MUCH BETTER SUSPECT SOME LV DYSFUNCTION STILL POST MITRAL VALVE REPLACEMENT MAY CONSIDER REINTRODUCING ENTRESTO OR LOW-DOSE ARB IF TOLERATED HER BLOOD PRESSURE ON THE LOWER END, DAILY WEIGHT 2 LB PER DAY 5 LB PER WEEK RULE EXPLAINED, NO OVERT HEART FAILURE CLINICALLY STABLE ARRHYTHMIA DIET EXERCISE CARDIAC REHAB RETURN TO CLINIC IN 3 MO WITH LABS      Left heart failure  -     BNP; Future; Expected date: 11/29/2024    Long term current use of amiodarone  -     Comprehensive Metabolic Panel; Future; Expected date: 08/29/2024  -     TSH; Future; Expected date: 08/29/2024    Pulmonary hypertension  Comments:  REASSESS    PAF (paroxysmal atrial fibrillation)    S/P mitral valve replacement    Overweight (BMI 25.0-29.9)    Stage 3a chronic kidney disease  -     Comprehensive Metabolic Panel; Future; Expected date: 08/29/2024    LAE (left atrial enlargement)    Other orders  -     amiodarone (PACERONE) 200 MG Tab; Take 0.5 tablets (100 mg total) by mouth once daily.  Dispense: 15 tablet; Refill: 2    RTC Low level/low impact aerobic exercise 5x's/wk. Heart healthy diet and risk factor modification.    See labs and med orders.    Aerobic exercise 5x's/wk. Heart healthy  diet and risk factor modification.    See labs and med orders.

## 2024-05-30 ENCOUNTER — HOSPITAL ENCOUNTER (OUTPATIENT)
Dept: CARDIOLOGY | Facility: HOSPITAL | Age: 74
Discharge: HOME | End: 2024-05-30
Attending: THORACIC SURGERY (CARDIOTHORACIC VASCULAR SURGERY)
Payer: MEDICARE

## 2024-05-30 VITALS — WEIGHT: 188 LBS | BODY MASS INDEX: 30.22 KG/M2 | HEART RATE: 54 BPM | HEIGHT: 66 IN

## 2024-05-30 DIAGNOSIS — Z95.2 HISTORY OF MITRAL VALVE REPLACEMENT: ICD-10-CM

## 2024-05-30 LAB
ASCENDING AORTA: 2.49 CM
AV INDEX (PROSTH): 0.78
AV MEAN GRADIENT: 5 MMHG
AV PEAK GRADIENT: 10 MMHG
AV VALVE AREA BY VELOCITY RATIO: 2.33 CM²
AV VALVE AREA: 2.43 CM²
AV VELOCITY RATIO: 0.74
BSA FOR ECHO PROCEDURE: 1.99 M2
CV ECHO LV RWT: 0.37 CM
DOP CALC AO PEAK VEL: 1.59 M/S
DOP CALC AO VTI: 32.5 CM
DOP CALC LVOT AREA: 3.1 CM2
DOP CALC LVOT DIAMETER: 2 CM
DOP CALC LVOT PEAK VEL: 1.18 M/S
DOP CALC LVOT STROKE VOLUME: 79.13 CM3
DOP CALC MV VTI: 51.3 CM
DOP CALCLVOT PEAK VEL VTI: 25.2 CM
E WAVE DECELERATION TIME: 454.7 MSEC
E/A RATIO: 5.34
E/E' RATIO: 20.78 M/S
ECHO LV POSTERIOR WALL: 0.94 CM (ref 0.6–1.1)
EJECTION FRACTION: 45 %
FRACTIONAL SHORTENING: 21 % (ref 28–44)
INTERVENTRICULAR SEPTUM: 0.92 CM (ref 0.6–1.1)
IVRT: 94.2 MSEC
LEFT ATRIUM SIZE: 4.67 CM
LEFT ATRIUM VOLUME INDEX MOD: 50.4 ML/M2
LEFT ATRIUM VOLUME MOD: 98.27 CM3
LEFT INTERNAL DIMENSION IN SYSTOLE: 4 CM (ref 2.1–4)
LEFT VENTRICLE DIASTOLIC VOLUME INDEX: 63.13 ML/M2
LEFT VENTRICLE DIASTOLIC VOLUME: 123.11 ML
LEFT VENTRICLE MASS INDEX: 87 G/M2
LEFT VENTRICLE SYSTOLIC VOLUME INDEX: 37 ML/M2
LEFT VENTRICLE SYSTOLIC VOLUME: 72.18 ML
LEFT VENTRICULAR INTERNAL DIMENSION IN DIASTOLE: 5.09 CM (ref 3.5–6)
LEFT VENTRICULAR MASS: 170.19 G
LV LATERAL E/E' RATIO: 17 M/S
LV SEPTAL E/E' RATIO: 26.71 M/S
LVOT MG: 2.64 MMHG
LVOT MV: 0.74 CM/S
MV MEAN GRADIENT: 4 MMHG
MV PEAK A VEL: 0.35 M/S
MV PEAK E VEL: 1.87 M/S
MV PEAK GRADIENT: 15 MMHG
MV STENOSIS PRESSURE HALF TIME: 131.86 MS
MV VALVE AREA BY CONTINUITY EQUATION: 1.54 CM2
MV VALVE AREA P 1/2 METHOD: 1.67 CM2
OHS CV RV/LV RATIO: 0.88 CM
PISA TR MAX VEL: 2.72 M/S
RA PRESSURE ESTIMATED: 8 MMHG
RA VOL SYS: 51.12 ML
RA WIDTH: 3.9 CM
RIGHT ATRIAL AREA: 17.7 CM2
RIGHT VENTRICULAR END-DIASTOLIC DIMENSION: 4.48 CM
RIGHT VENTRICULAR LENGTH IN DIASTOLE (APICAL 4-CHAMBER VIEW): 4.47 CM
RV MID DIAMA: 3.66 CM
RV TB RVSP: 11 MMHG
RV TISSUE DOPPLER FREE WALL SYSTOLIC VELOCITY 1 (APICAL 4 CHAMBER VIEW): 9.43 CM/S
SINUS: 2.87 CM
STJ: 2.53 CM
TDI LATERAL: 0.11 M/S
TDI SEPTAL: 0.07 M/S
TDI: 0.09 M/S
TR MAX PG: 30 MMHG
TRICUSPID ANNULAR PLANE SYSTOLIC EXCURSION: 2.37 CM
TV REST PULMONARY ARTERY PRESSURE: 38 MMHG
Z-SCORE OF LEFT VENTRICULAR DIMENSION IN END DIASTOLE: -0.88
Z-SCORE OF LEFT VENTRICULAR DIMENSION IN END SYSTOLE: 1.24

## 2024-05-30 PROCEDURE — 93306 TTE W/DOPPLER COMPLETE: CPT | Mod: 26,,, | Performed by: INTERNAL MEDICINE

## 2024-05-30 PROCEDURE — 93306 TTE W/DOPPLER COMPLETE: CPT | Mod: PO

## 2024-06-03 DIAGNOSIS — I50.1 LEFT HEART FAILURE: ICD-10-CM

## 2024-06-03 DIAGNOSIS — I42.0 DILATED CARDIOMYOPATHY: Primary | ICD-10-CM

## 2024-06-03 RX ORDER — POTASSIUM GLUCONATE 595(99)MG
1 TABLET, EXTENDED RELEASE ORAL DAILY
COMMUNITY
Start: 2024-06-03

## 2024-06-03 RX ORDER — FUROSEMIDE 20 MG/1
10 TABLET ORAL DAILY
Qty: 15 TABLET | Refills: 2 | Status: SHIPPED | OUTPATIENT
Start: 2024-06-03 | End: 2025-06-03

## 2024-06-03 NOTE — TELEPHONE ENCOUNTER
----- Message from Yon Palma MD sent at 6/3/2024  1:52 PM CDT -----  Regarding: RE: Weight gain  Start Lasix 10 mg daily and over the counter KCL daily  ----- Message -----  From: Sinai Valera LPN  Sent: 6/3/2024  10:41 AM CDT  To: Yon Palma MD  Subject: FW: Weight gain                                    ----- Message -----  From: Roc Carvajal RN  Sent: 6/3/2024   9:49 AM CDT  To: [facility]erv[facility]OME[facility] Staff; #  Subject: Weight gain                                      Ms. Monroy came to exercise at cardiac rehab today. On 5/24/24 her weight was 187.6 lbs. Today it was 197.8 lbs. She stated that she did eat some food at a cook out. She stated that she had trouble putting her left shoe on this morning due to swelling. Trace edema noted. She stated that her Lasix has been discontinued.     Please call patient for any adjustments in medications if you see fit.    Thank you,    Roc Carvajal RN

## 2024-06-20 ENCOUNTER — OFFICE VISIT (OUTPATIENT)
Dept: VASCULAR SURGERY | Facility: CLINIC | Age: 74
End: 2024-06-20
Payer: MEDICARE

## 2024-06-20 VITALS
HEART RATE: 60 BPM | SYSTOLIC BLOOD PRESSURE: 112 MMHG | DIASTOLIC BLOOD PRESSURE: 65 MMHG | HEIGHT: 66 IN | BODY MASS INDEX: 30.36 KG/M2 | WEIGHT: 188.94 LBS

## 2024-06-20 DIAGNOSIS — Z95.2 HISTORY OF MITRAL VALVE REPLACEMENT: Primary | ICD-10-CM

## 2024-06-20 DIAGNOSIS — I48.91 ATRIAL FIBRILLATION, UNSPECIFIED TYPE: ICD-10-CM

## 2024-06-20 DIAGNOSIS — I48.91 ATRIAL FIBRILLATION WITH RVR: ICD-10-CM

## 2024-06-20 DIAGNOSIS — I48.91 ATRIAL FIBRILLATION WITH RVR: Primary | ICD-10-CM

## 2024-06-20 PROCEDURE — 99213 OFFICE O/P EST LOW 20 MIN: CPT | Mod: PBBFAC,PO | Performed by: THORACIC SURGERY (CARDIOTHORACIC VASCULAR SURGERY)

## 2024-06-20 PROCEDURE — 99999 PR PBB SHADOW E&M-EST. PATIENT-LVL III: CPT | Mod: PBBFAC,,, | Performed by: THORACIC SURGERY (CARDIOTHORACIC VASCULAR SURGERY)

## 2024-06-20 PROCEDURE — 99024 POSTOP FOLLOW-UP VISIT: CPT | Mod: POP,,, | Performed by: THORACIC SURGERY (CARDIOTHORACIC VASCULAR SURGERY)

## 2024-06-20 NOTE — PROGRESS NOTES
6/20/2024...    The patient is status post mitral valve replacement, modified Maze procedure and ligation of the left atrial appendage 04/08/2024.  She had transient atrial fibrillation postoperatively and was discharged with amiodarone therapy.    The patient saw Dr. Palma 05/31/2024.  He decrease the amiodarone dose to 100 mg daily.    Echocardiography 05/30/2024 demonstrates a slight decrease in ejection fraction (45% down from 60% preop)-not unexpected.  The mitral valve prosthesis is functioning well.  There is moderate TR.      Dr. Palma has a entertained resuming Entresto.  Today, the patient's blood pressure is 112/65-I will not initiate Entresto therapy today.    The patient describes becoming fatigued, again, when walking to and from the chicken coop.  I have encouraged her to continue her activity level-reminding her that it is very hot outside, she has gained weight, and the ejection fraction only has dropped a little bit.    On physical exam, breath sounds are clear and equal bilaterally.  The sternotomy wound has healed beautifully-it is barely visible.  She has no lower extremity edema.    The patient has a mobile device that interpret her heart rhythm-she states it is always sinus rhythm.    EKG today confirms sinus rhythm.    I have advised her to take the last dose of coumadin 7/8/2024  She will follow-up with Dr. Palma on 09/04/2024.  I have encouraged her to remain active and try to lose some weight.  I will discharge her from the cardiac surgical clinic.

## 2024-07-08 ENCOUNTER — TELEPHONE (OUTPATIENT)
Dept: CARDIOLOGY | Facility: CLINIC | Age: 74
End: 2024-07-08
Payer: MEDICARE

## 2024-07-08 NOTE — TELEPHONE ENCOUNTER
----- Message from Clotilde Harden, Francesca sent at 7/8/2024 11:06 AM CDT -----  Good morning Dr. Palma,    Ms. Monroy was given approval from Dr. Boyd to stop her warfarin therapy as of today (7/8/24) as she has completed 3 months of warfarin therapy post her valve replacement.  In the past however, she did use Eliquis for anticoagulation, as she also has a hx of Afib.  Therefore, I wanted to make sure you were aware of the situation, in the case that you may prefer for her to resume her Eliquis.    Thanks,  Clotilde

## 2024-08-25 DIAGNOSIS — I42.0 DILATED CARDIOMYOPATHY: ICD-10-CM

## 2024-08-25 DIAGNOSIS — I50.1 LEFT HEART FAILURE: ICD-10-CM

## 2024-08-26 RX ORDER — FUROSEMIDE 20 MG/1
10 TABLET ORAL
Qty: 15 TABLET | Refills: 0 | Status: SHIPPED | OUTPATIENT
Start: 2024-08-26

## 2024-08-27 ENCOUNTER — LAB VISIT (OUTPATIENT)
Dept: PRIMARY CARE CLINIC | Facility: CLINIC | Age: 74
End: 2024-08-27
Payer: MEDICARE

## 2024-08-27 DIAGNOSIS — N18.31 STAGE 3A CHRONIC KIDNEY DISEASE: Chronic | ICD-10-CM

## 2024-08-27 DIAGNOSIS — I50.1 LEFT HEART FAILURE: Chronic | ICD-10-CM

## 2024-08-27 DIAGNOSIS — Z79.899 LONG TERM CURRENT USE OF AMIODARONE: Chronic | ICD-10-CM

## 2024-08-27 LAB
ALBUMIN SERPL BCP-MCNC: 3.8 G/DL (ref 3.5–5.2)
ALP SERPL-CCNC: 72 U/L (ref 55–135)
ALT SERPL W/O P-5'-P-CCNC: 10 U/L (ref 10–44)
ANION GAP SERPL CALC-SCNC: 8 MMOL/L (ref 8–16)
AST SERPL-CCNC: 16 U/L (ref 10–40)
BILIRUB SERPL-MCNC: 0.6 MG/DL (ref 0.1–1)
BNP SERPL-MCNC: 175 PG/ML (ref 0–99)
BUN SERPL-MCNC: 15 MG/DL (ref 8–23)
CALCIUM SERPL-MCNC: 9.5 MG/DL (ref 8.7–10.5)
CHLORIDE SERPL-SCNC: 107 MMOL/L (ref 95–110)
CO2 SERPL-SCNC: 26 MMOL/L (ref 23–29)
CREAT SERPL-MCNC: 1.1 MG/DL (ref 0.5–1.4)
EST. GFR  (NO RACE VARIABLE): 53.4 ML/MIN/1.73 M^2
GLUCOSE SERPL-MCNC: 94 MG/DL (ref 70–110)
POTASSIUM SERPL-SCNC: 4.8 MMOL/L (ref 3.5–5.1)
PROT SERPL-MCNC: 6.5 G/DL (ref 6–8.4)
SODIUM SERPL-SCNC: 141 MMOL/L (ref 136–145)
TSH SERPL DL<=0.005 MIU/L-ACNC: 3.76 UIU/ML (ref 0.4–4)

## 2024-08-27 PROCEDURE — 83880 ASSAY OF NATRIURETIC PEPTIDE: CPT | Performed by: INTERNAL MEDICINE

## 2024-08-27 PROCEDURE — 80053 COMPREHEN METABOLIC PANEL: CPT | Performed by: INTERNAL MEDICINE

## 2024-08-27 PROCEDURE — 84443 ASSAY THYROID STIM HORMONE: CPT | Performed by: INTERNAL MEDICINE

## 2024-09-25 DIAGNOSIS — I48.3 TYPICAL ATRIAL FLUTTER: Chronic | ICD-10-CM

## 2024-09-25 RX ORDER — METOPROLOL SUCCINATE 25 MG/1
25 TABLET, EXTENDED RELEASE ORAL DAILY
Qty: 90 TABLET | Refills: 0 | Status: SHIPPED | OUTPATIENT
Start: 2024-09-25

## 2024-10-16 ENCOUNTER — OFFICE VISIT (OUTPATIENT)
Dept: CARDIOLOGY | Facility: CLINIC | Age: 74
End: 2024-10-16
Payer: MEDICARE

## 2024-10-16 VITALS
WEIGHT: 182.31 LBS | BODY MASS INDEX: 29.3 KG/M2 | HEART RATE: 75 BPM | SYSTOLIC BLOOD PRESSURE: 128 MMHG | DIASTOLIC BLOOD PRESSURE: 60 MMHG | HEIGHT: 66 IN

## 2024-10-16 DIAGNOSIS — Z95.2 S/P MITRAL VALVE REPLACEMENT: Chronic | ICD-10-CM

## 2024-10-16 DIAGNOSIS — Z79.899 LONG TERM CURRENT USE OF AMIODARONE: Chronic | ICD-10-CM

## 2024-10-16 DIAGNOSIS — I65.23 CAROTID ARTERY PLAQUE, BILATERAL: ICD-10-CM

## 2024-10-16 DIAGNOSIS — I48.3 TYPICAL ATRIAL FLUTTER: Chronic | ICD-10-CM

## 2024-10-16 DIAGNOSIS — N18.31 STAGE 3A CHRONIC KIDNEY DISEASE: Chronic | ICD-10-CM

## 2024-10-16 DIAGNOSIS — I50.1 LEFT HEART FAILURE: Primary | Chronic | ICD-10-CM

## 2024-10-16 DIAGNOSIS — I27.20 PULMONARY HYPERTENSION: Chronic | ICD-10-CM

## 2024-10-16 PROCEDURE — 99214 OFFICE O/P EST MOD 30 MIN: CPT | Mod: S$GLB,,, | Performed by: INTERNAL MEDICINE

## 2024-10-16 RX ORDER — METOPROLOL SUCCINATE 25 MG/1
25 TABLET, EXTENDED RELEASE ORAL DAILY
Qty: 90 TABLET | Refills: 1 | Status: SHIPPED | OUTPATIENT
Start: 2024-10-16 | End: 2024-10-16 | Stop reason: SDUPTHER

## 2024-10-16 RX ORDER — METOPROLOL SUCCINATE 25 MG/1
25 TABLET, EXTENDED RELEASE ORAL DAILY
Qty: 90 TABLET | Refills: 1 | Status: SHIPPED | OUTPATIENT
Start: 2024-10-16

## 2024-10-16 RX ORDER — LOSARTAN POTASSIUM 25 MG/1
12.5 TABLET ORAL NIGHTLY
Qty: 45 TABLET | Refills: 1 | Status: SHIPPED | OUTPATIENT
Start: 2024-10-16 | End: 2025-10-16

## 2024-10-16 NOTE — PROGRESS NOTES
Subjective:    Patient ID:  Veronica Monroy is a 74 y.o. female who presents for Follow-up, Congestive Heart Failure, and Atrial Fibrillation        Follow-up  Pertinent negatives include no abdominal pain, chest pain, chills, congestion, diaphoresis, fever, nausea or sore throat.       DISCUSSED TESTS, ECHO EF 45-50%, MVR OK, LAE, ,PAP 38 MMHG, CMP GFR 53, , TSH OK, DOING WELL,MORE ENERGY,  MIGHT NEED TKR, SEE ROS    Left Ventricle: The left ventricle is normal in size.  Septal motion is consistent with post-operative status. There is mildly reduced systolic function. Ejection fraction by visual approximation is 45 -50 %.    Right Ventricle: Normal right ventricular cavity size. Systolic function is normal.    Left Atrium: Left atrium is  moderately to severely dilated.    Mitral Valve: There is a bioprosthetic valve in the mitral position that is well-seated.    Tricuspid Valve: There is moderate regurgitation.    Pulmonary Artery: The estimated pulmonary artery systolic pressure is 38 mmHg.    IVC/SVC: Intermediate venous pressure at 8 mmHg.    No pericardial effusion.  Past Medical History:   Diagnosis Date    A-fib     Anticoagulant long-term use     Arthritis     CKD stage 3a, GFR 45-59 ml/min     Hyperlipidemia     Mitral valve stenosis     Pulmonary hypertension      Past Surgical History:   Procedure Laterality Date    ABLATION AND RECONSTRUCTION, CARDIAC ATRIUM, EXTENSIVE, WITH CARDIOPULMONARY BYPASS N/A 4/8/2024    Procedure: ABLATION AND RECONSTRUCTION, CARDIAC ATRIUM, EXTENSIVE, WITH CARDIOPULMONARY BYPASS;  Surgeon: Laura Boyd MD;  Location: [facility] OR;  Service: Cardiothoracic;  Laterality: N/A;    APPENDECTOMY      ARTERIOGRAPHY OF AORTIC ROOT  02/20/2024    Procedure: AO Root;  Surgeon: Yon Palma MD;  Location: [facility] CATH;  Service: Cardiology;;    CATARACT Bilateral     CATHETERIZATION OF BOTH LEFT AND RIGHT HEART  02/20/2024    Procedure: Right / Left heart cath;  Surgeon: Yon Palma  MD;  Location: [facility] CATH;  Service: Cardiology;;    COLONOSCOPY      CORONARY ANGIOGRAPHY N/A 2022    Procedure: ANGIOGRAM, CORONARY ARTERY;  Surgeon: Yon Palma MD;  Location: [facility] CATH;  Service: Cardiology;  Laterality: N/A;    CORONARY ANGIOGRAPHY N/A 2024    Procedure: ANGIOGRAM, CORONARY ARTERY;  Surgeon: Yon Palma MD;  Location: [facility] CATH;  Service: Cardiology;  Laterality: N/A;    EXCLUSION, LEFT ATRIAL APPENDAGE, OPEN, AS PART OF OPEN CHEST SURGERY Left 2024    Procedure: EXCLUSION, LEFT ATRIAL APPENDAGE, OPEN, AS PART OF OPEN CHEST SURGERY;  Surgeon: Laura Boyd MD;  Location: [facility] OR;  Service: Cardiothoracic;  Laterality: Left;    HYSTERECTOMY      LEFT HEART CATHETERIZATION Left 2022    Procedure: Left heart cath;  Surgeon: Yon Palma MD;  Location: [facility] CATH;  Service: Cardiology;  Laterality: Left;    MITRAL VALVE REPLACEMENT N/A 2024    Procedure: REPLACEMENT, MITRAL VALVE;  Surgeon: Laura Boyd MD;  Location: [facility] OR;  Service: Cardiothoracic;  Laterality: N/A;    ROTATOR CUFF REPAIR Left 2023    TRANSESOPHAGEAL ECHOCARDIOGRAPHY N/A 2024    Procedure: ECHOCARDIOGRAM, TRANSESOPHAGEAL;  Surgeon: Laura Boyd MD;  Location: [facility] OR;  Service: Cardiothoracic;  Laterality: N/A;     Family History   Problem Relation Name Age of Onset    Heart disease Mother      Arrhythmia Mother       Social History     Socioeconomic History    Marital status:    Tobacco Use    Smoking status: Former     Current packs/day: 0.00     Types: Cigarettes     Start date: 3/18/2010     Quit date: 3/18/2016     Years since quittin.5    Smokeless tobacco: Never   Substance and Sexual Activity    Alcohol use: Not Currently    Drug use: Not Currently     Types: Marijuana     Comment: cbd oil, quit    Sexual activity: Not Currently     Social Drivers of Health     Financial Resource Strain: Low Risk  (2023)    Received from [facility] and Its Subsidiaries and Affiliates, [facility] and Its Subsidiaries and Affiliates    Overall Financial Resource Strain (CARDIA)     Difficulty of Paying Living Expenses: Not hard at all   Food Insecurity: No Food Insecurity (4/9/2024)    Hunger Vital Sign     Worried About Running Out of Food in the Last Year: Never true     Ran Out of Food in the Last Year: Never true   Transportation Needs: No Transportation Needs (5/6/2024)    OASIS : Transportation     Lack of Transportation (Medical): No     Lack of Transportation (Non-Medical): No     Patient Unable or Declines to Respond: No   Stress: No Stress Concern Present (7/2/2023)    [facility]    [facility] of Occupational Health - Occupational Stress Questionnaire     Feeling of Stress : Not at all   Housing Stability: Low Risk  (4/9/2024)    Housing Stability Vital Sign     Unable to Pay for Housing in the Last Year: No     Number of Places Lived in the Last Year: 1     Unstable Housing in the Last Year: No       Review of patient's allergies indicates:  No Known Allergies    Current Outpatient Medications:     amiodarone (PACERONE) 200 MG Tab, Take 0.5 tablets (100 mg total) by mouth once daily., Disp: 15 tablet, Rfl: 2    aspirin (ECOTRIN) 81 MG EC tablet, Take 1 tablet (81 mg total) by mouth once daily., Disp: 360 tablet, Rfl: 0    ondansetron (ZOFRAN) 4 MG tablet, Take 4 mg by mouth every 8 (eight) hours as needed., Disp: , Rfl:     furosemide (LASIX) 20 MG tablet, Take 1/2 (one-half) tablet by mouth once daily (Patient not taking: Reported on 10/16/2024), Disp: 15 tablet, Rfl: 0    gabapentin (NEURONTIN) 600 MG tablet, Take 300 mg by mouth once daily. (Patient not taking: Reported on 10/16/2024), Disp: , Rfl:     losartan  "(COZAAR) 25 MG tablet, Take 0.5 tablets (12.5 mg total) by mouth nightly., Disp: 45 tablet, Rfl: 1    metoprolol succinate (TOPROL-XL) 25 MG 24 hr tablet, Take 1 tablet (25 mg total) by mouth once daily., Disp: 90 tablet, Rfl: 1    potassium gluconate 595 mg (99 mg) TbSR, Take 1 tablet by mouth once daily. (Patient not taking: Reported on 10/16/2024), Disp: , Rfl:     sertraline (ZOLOFT) 100 MG tablet, Take 100 mg by mouth once daily. Indications: anxiousness associated with depression (Patient not taking: Reported on 10/16/2024), Disp: , Rfl:     warfarin (COUMADIN) 1 MG tablet, Take 1 mg by mouth Daily. 5/2/24 COUMADIN 1.5 MG ( 1 1/2 OF 1 MG TAB) PO  5/3/24 COUMADIN 0.5MG ( 1/2 OF 1MG TAB) PO 5/4/24 COUMADIN 1MG ( 1 0F 1 MG TAB) PO 5/5/24 COUMADIN 1MG ( 1 0F 1 MG TAB) PO 5/6/24 COUMADIN 0.5MG ( 1/2 OF 1MG TAB) PO 5/7/24 COUMADIN 1MG ( 1 0F 1 MG TAB) PO 5/8/24 COUMADIN 1MG ( 1 0F 1 MG TAB) PO 5/9/24 REPEAT PT/INR    Indications: MVR (Patient not taking: Reported on 10/16/2024), Disp: , Rfl:     Review of Systems   Constitutional: Negative for chills, decreased appetite, diaphoresis, fever and malaise/fatigue (LESS).   HENT:  Negative for congestion and sore throat.    Eyes: Negative.    Cardiovascular:  Negative for chest pain, claudication, cyanosis, dyspnea on exertion, irregular heartbeat and leg swelling.   Respiratory: Negative.     Endocrine: Negative for polyphagia and polyuria.   Musculoskeletal:  Positive for arthritis and joint pain (KNEE). Negative for falls.   Gastrointestinal:  Negative for abdominal pain, dysphagia, jaundice, melena and nausea.   Genitourinary:  Positive for dysuria. Negative for flank pain.   Neurological:  Negative for brief paralysis and focal weakness.   Psychiatric/Behavioral:  Negative for altered mental status and depression.         Objective:      Vitals:    10/16/24 0853   BP: 128/60   Pulse: 75   Weight: 82.7 kg (182 lb 5.1 oz)   Height: 5' 6" (1.676 m)   PainSc:   5 "   PainLoc: Knee     Body mass index is 29.43 kg/m².    Physical Exam  Constitutional:       Appearance: Normal appearance. She is overweight.   HENT:      Head: Normocephalic and atraumatic.   Eyes:      Extraocular Movements: Extraocular movements intact.      Conjunctiva/sclera: Conjunctivae normal.   Neck:      Vascular: No JVD.   Cardiovascular:      Rate and Rhythm: Normal rate and regular rhythm. No extrasystoles are present.     Pulses:           Carotid pulses are 2+ on the right side and 2+ on the left side.       Radial pulses are 2+ on the right side and 2+ on the left side.        Posterior tibial pulses are 2+ on the right side and 2+ on the left side.      Heart sounds: Murmur heard.      Systolic murmur is present with a grade of 2/6 at the upper right sternal border.      No friction rub. No gallop.   Pulmonary:      Effort: Pulmonary effort is normal. No respiratory distress.      Breath sounds: Normal breath sounds. No rales.   Abdominal:      Palpations: Abdomen is soft.      Tenderness: There is no abdominal tenderness.   Musculoskeletal:      Cervical back: Neck supple.      Right lower leg: No edema.      Left lower leg: No edema.   Skin:     General: Skin is warm and dry.      Capillary Refill: Capillary refill takes less than 2 seconds.   Neurological:      General: No focal deficit present.      Mental Status: She is alert and oriented to person, place, and time.   Psychiatric:         Mood and Affect: Mood normal.         Speech: Speech normal.         Behavior: Behavior normal.                 ..    Chemistry        Component Value Date/Time     08/27/2024 0917    K 4.8 08/27/2024 0917     08/27/2024 0917    CO2 26 08/27/2024 0917    BUN 15 08/27/2024 0917    CREATININE 1.1 08/27/2024 0917    GLU 94 08/27/2024 0917        Component Value Date/Time    CALCIUM 9.5 08/27/2024 0917    ALKPHOS 72 08/27/2024 0917    AST 16 08/27/2024 0917    ALT 10 08/27/2024 0917    BILITOT 0.6  08/27/2024 0917    ESTGFRAFRICA CANCELED 04/09/2024 0324    EGFRNONAA CANCELED 04/09/2024 0324            ..  Lab Results   Component Value Date    CHOL 166 07/02/2023    CHOL 171 03/18/2022     Lab Results   Component Value Date    HDL 51 07/02/2023    HDL 63 03/18/2022     Lab Results   Component Value Date    LDLCALC 97 07/02/2023    LDLCALC 92.6 03/18/2022     Lab Results   Component Value Date    TRIG 90 07/02/2023    TRIG 77 03/18/2022     Lab Results   Component Value Date    CHOLHDL 36.8 03/18/2022     ..  Lab Results   Component Value Date    WBC 6.30 04/22/2024    HGB 9.5 (L) 04/22/2024    HCT 28.9 (L) 04/22/2024     (H) 04/22/2024     04/22/2024       Test(s) Reviewed  I have reviewed the following in detail:  [] Stress test   [] Angiography   [] Echocardiogram   [x] Labs   [] Other:       Assessment:         ICD-10-CM ICD-9-CM   1. Left heart failure  I50.1 428.1   2. Long term current use of amiodarone  Z79.899 V58.69   3. Pulmonary hypertension  I27.20 416.8   4. Typical atrial flutter  I48.3 427.32   5. Stage 3a chronic kidney disease  N18.31 585.3   6. S/P mitral valve replacement  Z95.2 V43.3   7. Carotid artery plaque, bilateral  I65.23 433.10     433.30     Problem List Items Addressed This Visit          Cardiac/Vascular    Left heart failure - Primary    Relevant Orders    Comprehensive Metabolic Panel    BNP    Pulmonary hypertension    Carotid artery plaque, bilateral    Typical atrial flutter    Relevant Medications    metoprolol succinate (TOPROL-XL) 25 MG 24 hr tablet    S/P mitral valve replacement    Long term current use of amiodarone    Relevant Orders    Comprehensive Metabolic Panel    TSH       Renal/    Stage 3a chronic kidney disease        Plan:     ADD LOSARTAN 12.5, DAILY WEIGHT, 2 LB PER DAY 5 LB PER WEEK RULE STABLE, OK FOR KNEE SURGERY, HAS BEEN DOING WELL SINCE HER MITRAL VALVE REPAIR, NOW CLASS 2 HEART FAILURE, PA PRESSURE HAS IMPROVED, NO CLINICAL  ARRHYTHMIA NO ANGINA DIET EXERCISE AVOID DEHYDRATION RETURN TO CLINIC IN 5 MO  WITH LABS      Left heart failure  Comments:  CLINICALLY IMPROVED  Orders:  -     Comprehensive Metabolic Panel; Future; Expected date: 04/16/2025  -     BNP; Future; Expected date: 04/16/2025    Long term current use of amiodarone  -     Comprehensive Metabolic Panel; Future; Expected date: 04/16/2025  -     TSH; Future; Expected date: 10/16/2024    Pulmonary hypertension    Typical atrial flutter  Comments:  RESOLVED  Orders:  -     Discontinue: metoprolol succinate (TOPROL-XL) 25 MG 24 hr tablet; Take 1 tablet (25 mg total) by mouth once daily.  Dispense: 90 tablet; Refill: 1  -     metoprolol succinate (TOPROL-XL) 25 MG 24 hr tablet; Take 1 tablet (25 mg total) by mouth once daily.  Dispense: 90 tablet; Refill: 1    Stage 3a chronic kidney disease    S/P mitral valve replacement    Carotid artery plaque, bilateral    Other orders  -     losartan (COZAAR) 25 MG tablet; Take 0.5 tablets (12.5 mg total) by mouth nightly.  Dispense: 45 tablet; Refill: 1    RTC Low level/low impact aerobic exercise 5x's/wk. Heart healthy diet and risk factor modification.    See labs and med orders.    Aerobic exercise 5x's/wk. Heart healthy diet and risk factor modification.    See labs and med orders.      ALL CV CLINICALLY STABLE, NO ANGINA, NO HF, NO TIA, NO CLINICAL ARRHYTHMIA,CONTINUE CURRENT MEDS, EDUCATION, DIET, EXERCISE

## 2025-02-26 ENCOUNTER — TELEPHONE (OUTPATIENT)
Dept: CARDIOLOGY | Facility: CLINIC | Age: 75
End: 2025-02-26
Payer: MEDICARE

## 2025-02-26 NOTE — TELEPHONE ENCOUNTER
Cardiac clearance for right total knee arthoplasty with LUZ on 3/14/25. Spinal anesthesia. Pt taking ASA, Coumadin (pt reported not taking on 10/16/24.)   Implant: Porcine    [facility]  Fax: [phone]

## 2025-04-10 ENCOUNTER — LAB VISIT (OUTPATIENT)
Dept: PRIMARY CARE CLINIC | Facility: CLINIC | Age: 75
End: 2025-04-10
Payer: MEDICARE

## 2025-04-10 DIAGNOSIS — I50.1 LEFT HEART FAILURE: Chronic | ICD-10-CM

## 2025-04-10 DIAGNOSIS — Z79.899 LONG TERM CURRENT USE OF AMIODARONE: Chronic | ICD-10-CM

## 2025-04-10 DIAGNOSIS — I27.20 PULMONARY HYPERTENSION: Primary | ICD-10-CM

## 2025-04-10 DIAGNOSIS — I48.3 TYPICAL ATRIAL FLUTTER: Chronic | ICD-10-CM

## 2025-04-10 LAB
ALBUMIN SERPL BCP-MCNC: 3.6 G/DL (ref 3.5–5.2)
ALP SERPL-CCNC: 69 UNIT/L (ref 40–150)
ALT SERPL W/O P-5'-P-CCNC: 9 UNIT/L (ref 10–44)
ANION GAP (OHS): 7 MMOL/L (ref 8–16)
AST SERPL-CCNC: 18 UNIT/L (ref 11–45)
BILIRUB SERPL-MCNC: 0.6 MG/DL (ref 0.1–1)
BNP SERPL-MCNC: 374 PG/ML (ref 0–99)
BUN SERPL-MCNC: 9 MG/DL (ref 8–23)
CALCIUM SERPL-MCNC: 9.1 MG/DL (ref 8.7–10.5)
CHLORIDE SERPL-SCNC: 106 MMOL/L (ref 95–110)
CO2 SERPL-SCNC: 28 MMOL/L (ref 23–29)
CREAT SERPL-MCNC: 0.9 MG/DL (ref 0.5–1.4)
GFR SERPLBLD CREATININE-BSD FMLA CKD-EPI: >60 ML/MIN/1.73/M2
GLUCOSE SERPL-MCNC: 96 MG/DL (ref 70–110)
POTASSIUM SERPL-SCNC: 5 MMOL/L (ref 3.5–5.1)
PROT SERPL-MCNC: 6.5 GM/DL (ref 6–8.4)
SODIUM SERPL-SCNC: 141 MMOL/L (ref 136–145)
TSH SERPL-ACNC: 2.93 UIU/ML (ref 0.4–4)

## 2025-04-10 PROCEDURE — 83880 ASSAY OF NATRIURETIC PEPTIDE: CPT | Performed by: INTERNAL MEDICINE

## 2025-04-10 PROCEDURE — 84443 ASSAY THYROID STIM HORMONE: CPT | Performed by: INTERNAL MEDICINE

## 2025-04-10 PROCEDURE — 36415 COLL VENOUS BLD VENIPUNCTURE: CPT | Mod: S$GLB,,, | Performed by: INTERNAL MEDICINE

## 2025-04-10 PROCEDURE — 80053 COMPREHEN METABOLIC PANEL: CPT | Performed by: INTERNAL MEDICINE

## 2025-04-10 RX ORDER — METOPROLOL SUCCINATE 25 MG/1
25 TABLET, EXTENDED RELEASE ORAL
Qty: 90 TABLET | Refills: 0 | Status: SHIPPED | OUTPATIENT
Start: 2025-04-10

## 2025-04-10 RX ORDER — LOSARTAN POTASSIUM 25 MG/1
TABLET ORAL
Qty: 45 TABLET | Refills: 0 | Status: SHIPPED | OUTPATIENT
Start: 2025-04-10

## 2025-04-16 ENCOUNTER — OFFICE VISIT (OUTPATIENT)
Dept: CARDIOLOGY | Facility: CLINIC | Age: 75
End: 2025-04-16
Payer: MEDICARE

## 2025-04-16 VITALS
SYSTOLIC BLOOD PRESSURE: 125 MMHG | BODY MASS INDEX: 30.4 KG/M2 | DIASTOLIC BLOOD PRESSURE: 58 MMHG | WEIGHT: 189.13 LBS | HEART RATE: 65 BPM | HEIGHT: 66 IN

## 2025-04-16 DIAGNOSIS — I50.1 LEFT HEART FAILURE: Primary | Chronic | ICD-10-CM

## 2025-04-16 DIAGNOSIS — Z98.890 S/P LEFT ATRIAL APPENDAGE LIGATION: Chronic | ICD-10-CM

## 2025-04-16 DIAGNOSIS — Z79.01 LONG TERM (CURRENT) USE OF ANTICOAGULANTS: ICD-10-CM

## 2025-04-16 DIAGNOSIS — Z79.899 LONG TERM CURRENT USE OF AMIODARONE: Chronic | ICD-10-CM

## 2025-04-16 DIAGNOSIS — Z95.2 S/P MITRAL VALVE REPLACEMENT: Chronic | ICD-10-CM

## 2025-04-16 DIAGNOSIS — I65.23 CAROTID ARTERY PLAQUE, BILATERAL: Chronic | ICD-10-CM

## 2025-04-16 DIAGNOSIS — I48.92 PAROXYSMAL ATRIAL FLUTTER: Chronic | ICD-10-CM

## 2025-04-16 DIAGNOSIS — I42.0 DILATED CARDIOMYOPATHY: ICD-10-CM

## 2025-04-16 PROCEDURE — 99214 OFFICE O/P EST MOD 30 MIN: CPT | Mod: S$GLB,,, | Performed by: INTERNAL MEDICINE

## 2025-04-16 RX ORDER — FUROSEMIDE 20 MG/1
10 TABLET ORAL DAILY PRN
Qty: 45 TABLET | Refills: 0 | Status: SHIPPED | OUTPATIENT
Start: 2025-04-16

## 2025-04-16 NOTE — PROGRESS NOTES
Subjective:    Patient ID:  Veronica Monroy is a 75 y.o. female who presents for Follow-up, Congestive Heart Failure, and Heart Problem        HPI  DISCUSSED LABS AND GOALS CMP AND TSH OK, , DOING WELL, HAD R TKR  IN PT, NO CHEST PAIN NO SIGNIFICANT SHORTNESS OF BREATH NO TIA TYPE SYMPTOMS NO NEAR-SYNCOPE SEE ROS    Past Medical History:   Diagnosis Date    A-fib     Anticoagulant long-term use     Arthritis     CKD stage 3a, GFR 45-59 ml/min     Hyperlipidemia     Mitral valve stenosis     Pulmonary hypertension      Past Surgical History:   Procedure Laterality Date    ABLATION AND RECONSTRUCTION, CARDIAC ATRIUM, EXTENSIVE, WITH CARDIOPULMONARY BYPASS N/A 4/8/2024    Procedure: ABLATION AND RECONSTRUCTION, CARDIAC ATRIUM, EXTENSIVE, WITH CARDIOPULMONARY BYPASS;  Surgeon: Laura Boyd MD;  Location: [facility] OR;  Service: Cardiothoracic;  Laterality: N/A;    APPENDECTOMY      ARTERIOGRAPHY OF AORTIC ROOT  02/20/2024    Procedure: AO Root;  Surgeon: Yon Palma MD;  Location: [facility] CATH;  Service: Cardiology;;    CATARACT Bilateral     CATHETERIZATION OF BOTH LEFT AND RIGHT HEART  02/20/2024    Procedure: Right / Left heart cath;  Surgeon: Yon Palma MD;  Location: [facility] CATH;  Service: Cardiology;;    COLONOSCOPY      CORONARY ANGIOGRAPHY N/A 05/16/2022    Procedure: ANGIOGRAM, CORONARY ARTERY;  Surgeon: Yon Palma MD;  Location: [facility] CATH;  Service: Cardiology;  Laterality: N/A;    CORONARY ANGIOGRAPHY N/A 02/20/2024    Procedure: ANGIOGRAM, CORONARY ARTERY;  Surgeon: Yon Palma MD;  Location: [facility] CATH;  Service: Cardiology;  Laterality: N/A;    EXCLUSION, LEFT ATRIAL APPENDAGE, OPEN, AS PART OF OPEN CHEST SURGERY Left 4/8/2024    Procedure: EXCLUSION, LEFT ATRIAL APPENDAGE, OPEN, AS PART OF OPEN CHEST SURGERY;  Surgeon: Laura Boyd MD;  Location: [facility] OR;  Service: Cardiothoracic;  Laterality: Left;    HYSTERECTOMY      LEFT HEART CATHETERIZATION Left 05/16/2022    Procedure: Left heart cath;   Surgeon: Yon Palma MD;  Location: [facility] CATH;  Service: Cardiology;  Laterality: Left;    MITRAL VALVE REPLACEMENT N/A 2024    Procedure: REPLACEMENT, MITRAL VALVE;  Surgeon: Laura Boyd MD;  Location: [facility] OR;  Service: Cardiothoracic;  Laterality: N/A;    ROTATOR CUFF REPAIR Left 2023    TRANSESOPHAGEAL ECHOCARDIOGRAPHY N/A 2024    Procedure: ECHOCARDIOGRAM, TRANSESOPHAGEAL;  Surgeon: Laura Boyd MD;  Location: [facility] OR;  Service: Cardiothoracic;  Laterality: N/A;     Family History   Problem Relation Name Age of Onset    Heart disease Mother      Arrhythmia Mother       Social History     Socioeconomic History    Marital status:    Tobacco Use    Smoking status: Former     Current packs/day: 0.00     Types: Cigarettes     Start date: 3/18/2010     Quit date: 3/18/2016     Years since quittin.0    Smokeless tobacco: Never   Substance and Sexual Activity    Alcohol use: Not Currently    Drug use: Not Currently     Types: Marijuana     Comment: cbd oil, quit    Sexual activity: Not Currently     Social Drivers of Health     Financial Resource Strain: Low Risk  (2023)    Received from [facility] Subsidiaries and Affiliates    Overall Financial Resource Strain (CARDIA)     Difficulty of Paying Living Expenses: Not hard at all   Food Insecurity: No Food Insecurity (2024)    Hunger Vital Sign     Worried About Running Out of Food in the Last Year: Never true     Ran Out of Food in the Last Year: Never true   Transportation Needs: No Transportation Needs (2024)    [place] : Transportation     Lack of Transportation (Medical): No     Lack of Transportation (Non-Medical): No     Patient Unable or Declines to Respond: No   Stress: No Stress Concern Present (2023)    Received from [facility] [facility] Subsidi[facility] and [facility]iliates    Citizen of [place] [employer] [employer] - Occupational Stress  "Questionnaire     Feeling of Stress : Not at all   Housing Stability: Low Risk  (4/9/2024)    Housing Stability Vital Sign     Unable to Pay for Housing in the Last Year: No     Number of Places Lived in the Last Year: 1     Unstable Housing in the Last Year: No       Review of patient's allergies indicates:  No Known Allergies  Current Medications[1]    Review of Systems   Constitutional: Positive for weight gain. Negative for chills, decreased appetite, diaphoresis, fever and malaise/fatigue.   HENT:  Negative for congestion and sore throat.    Eyes:  Negative for blurred vision and visual disturbance.   Cardiovascular:  Negative for chest pain, claudication, cyanosis, dyspnea on exertion (MINIMAL), irregular heartbeat, leg swelling, near-syncope, orthopnea, palpitations, paroxysmal nocturnal dyspnea and syncope.   Respiratory:  Negative for cough, hemoptysis and shortness of breath.    Endocrine: Negative for polyphagia and polyuria.   Skin:  Negative for color change and rash.   Musculoskeletal:  Positive for arthritis. Negative for falls. Joint pain: LESS.  Gastrointestinal:  Negative for abdominal pain, dysphagia, melena and nausea.   Genitourinary:  Negative for dysuria and flank pain.   Neurological:  Negative for brief paralysis and focal weakness.   Psychiatric/Behavioral:  Negative for altered mental status and depression.         Objective:      Vitals:    04/16/25 0853   BP: (!) 125/58   Pulse: 65   Weight: 85.8 kg (189 lb 2.5 oz)   Height: 5' 6" (1.676 m)   PainSc: 0-No pain     Body mass index is 30.53 kg/m².    Physical Exam  Constitutional:       Appearance: Normal appearance. She is obese.   HENT:      Head: Normocephalic and atraumatic.   Eyes:      General: No scleral icterus.     Extraocular Movements: Extraocular movements intact.   Neck:      Vascular: No JVD.   Cardiovascular:      Rate and Rhythm: Normal rate and regular rhythm. No extrasystoles are present.     Pulses:           Carotid " pulses are 2+ on the right side and 2+ on the left side.       Radial pulses are 2+ on the right side and 2+ on the left side.        Posterior tibial pulses are 2+ on the right side and 2+ on the left side.      Heart sounds: Murmur heard.      Systolic murmur is present with a grade of 1/6 at the upper right sternal border.      No friction rub. No gallop.   Pulmonary:      Effort: Pulmonary effort is normal. No respiratory distress.      Breath sounds: Normal breath sounds. No rales.   Abdominal:      Palpations: Abdomen is soft.      Tenderness: There is no abdominal tenderness.   Musculoskeletal:      Cervical back: Neck supple.      Right lower leg: No edema.      Left lower leg: No edema.      Comments: USING A WALKER   Skin:     General: Skin is warm and dry.      Capillary Refill: Capillary refill takes less than 2 seconds.   Neurological:      General: No focal deficit present.      Mental Status: She is alert and oriented to person, place, and time.   Psychiatric:         Mood and Affect: Mood normal.         Speech: Speech normal.         Behavior: Behavior normal.                 ..    Chemistry        Component Value Date/Time     04/10/2025 0857     08/27/2024 0917    K 5.0 04/10/2025 0857    K 4.8 08/27/2024 0917     04/10/2025 0857     08/27/2024 0917    CO2 28 04/10/2025 0857    CO2 26 08/27/2024 0917    BUN 9 04/10/2025 0857    CREATININE 0.9 04/10/2025 0857    GLU 94 08/27/2024 0917        Component Value Date/Time    CALCIUM 9.1 04/10/2025 0857    CALCIUM 9.5 08/27/2024 0917    ALKPHOS 69 04/10/2025 0857    ALKPHOS 72 08/27/2024 0917    AST 18 04/10/2025 0857    AST 16 08/27/2024 0917    ALT 9 (L) 04/10/2025 0857    ALT 10 08/27/2024 0917    BILITOT 0.6 04/10/2025 0857    BILITOT 0.6 08/27/2024 0917    ESTGFRAFRICA CANCELED 04/09/2024 0324    EGFRNONAA CANCELED 04/09/2024 0324            ..  Lab Results   Component Value Date    CHOL 166 07/02/2023    CHOL 171 03/18/2022      Lab Results   Component Value Date    HDL 51 07/02/2023    HDL 63 03/18/2022     Lab Results   Component Value Date    LDLCALC 97 07/02/2023    LDLCALC 92.6 03/18/2022     Lab Results   Component Value Date    TRIG 90 07/02/2023    TRIG 77 03/18/2022     Lab Results   Component Value Date    CHOLHDL 36.8 03/18/2022     ..  Lab Results   Component Value Date    WBC 6.30 04/22/2024    HGB 9.5 (L) 04/22/2024    HCT 28.9 (L) 04/22/2024     (H) 04/22/2024     04/22/2024       Test(s) Reviewed  I have reviewed the following in detail:  [] Stress test   [] Angiography   [] Echocardiogram   [x] Labs   [] Other:       Assessment:         ICD-10-CM ICD-9-CM   1. Left heart failure  I50.1 428.1   2. Paroxysmal atrial flutter  I48.92 427.32   3. Carotid artery plaque, bilateral  I65.23 433.10     433.30   4. Long term current use of amiodarone  Z79.899 V58.69   5. Long term (current) use of anticoagulants  Z79.01 V58.61   6. S/P left atrial appendage ligation  Z98.890 V45.89   7. S/P mitral valve replacement  Z95.2 V43.3   8. Body mass index (BMI) of 30.0 to 30.9 in adult  Z68.30 V85.30   9. Dilated cardiomyopathy  I42.0 425.4     Problem List Items Addressed This Visit          Cardiac/Vascular    Dilated cardiomyopathy    Relevant Medications    furosemide (LASIX) 20 MG tablet    Left heart failure - Primary    Relevant Medications    furosemide (LASIX) 20 MG tablet    Other Relevant Orders    Comprehensive Metabolic Panel    Magnesium    BNP    Carotid artery plaque, bilateral    Typical atrial flutter    S/P mitral valve replacement    Long term current use of amiodarone    Relevant Orders    Comprehensive Metabolic Panel    TSH    S/P left atrial appendage ligation       Hematology    Long term (current) use of anticoagulants       Endocrine    Body mass index (BMI) of 30.0 to 30.9 in adult (Chronic)        Plan:     DAILY WEIGHT, 2 LB PER DAY 5 LB PER WEEK RULE EXPLAINED AGAIN    ALL CV CLINICALLY  STABLE, NO ANGINA, CLASS 1-2 HF, NO TIA, NO CLINICAL ARRHYTHMIA,CONTINUE CURRENT MEDS, EDUCATION, DIET, EXERCISE , NO NEED FOR ANTICOAGULATION HAD LEFT ATRIAL APPENDAGE LIGATION, RETURN TO CLINIC IN 6 MONTHS WITH LABS        Left heart failure  Comments:  STABLE  Orders:  -     Comprehensive Metabolic Panel; Future; Expected date: 04/16/2025  -     Magnesium; Future; Expected date: 04/16/2025  -     furosemide (LASIX) 20 MG tablet; Take 0.5 tablets (10 mg total) by mouth daily as needed (WEIGHT GAIN).  Dispense: 45 tablet; Refill: 0  -     BNP; Future; Expected date: 10/16/2025    Paroxysmal atrial flutter  Comments:  RESOLVED    Carotid artery plaque, bilateral    Long term current use of amiodarone  -     Comprehensive Metabolic Panel; Future; Expected date: 04/16/2025  -     TSH; Future; Expected date: 04/16/2025    Long term (current) use of anticoagulants    S/P left atrial appendage ligation    S/P mitral valve replacement    Body mass index (BMI) of 30.0 to 30.9 in adult    Dilated cardiomyopathy  -     furosemide (LASIX) 20 MG tablet; Take 0.5 tablets (10 mg total) by mouth daily as needed (WEIGHT GAIN).  Dispense: 45 tablet; Refill: 0    RTC Low level/low impact aerobic exercise 5x's/wk. Heart healthy diet and risk factor modification.    See labs and med orders.    Aerobic exercise 5x's/wk. Heart healthy diet and risk factor modification.    See labs and med orders.             [1]   Current Outpatient Medications:     amiodarone (PACERONE) 200 MG Tab, Take 0.5 tablets (100 mg total) by mouth once daily., Disp: 15 tablet, Rfl: 2    aspirin (ECOTRIN) 81 MG EC tablet, Take 1 tablet (81 mg total) by mouth once daily., Disp: 360 tablet, Rfl: 0    losartan (COZAAR) 25 MG tablet, TAKE 1/2 (ONE-HALF) TABLET BY MOUTH NIGHTLY, Disp: 45 tablet, Rfl: 0    metoprolol succinate (TOPROL-XL) 25 MG 24 hr tablet, Take 1 tablet by mouth once daily, Disp: 90 tablet, Rfl: 0    ondansetron (ZOFRAN) 4 MG tablet, Take 4 mg by  mouth every 8 (eight) hours as needed., Disp: , Rfl:     furosemide (LASIX) 20 MG tablet, Take 0.5 tablets (10 mg total) by mouth daily as needed (WEIGHT GAIN)., Disp: 45 tablet, Rfl: 0    gabapentin (NEURONTIN) 600 MG tablet, Take 300 mg by mouth once daily. (Patient not taking: Reported on 4/16/2025), Disp: , Rfl:     potassium gluconate 595 mg (99 mg) TbSR, Take 1 tablet by mouth once daily. (Patient not taking: Reported on 4/16/2025), Disp: , Rfl:     sertraline (ZOLOFT) 100 MG tablet, Take 100 mg by mouth once daily. Indications: anxiousness associated with depression (Patient not taking: Reported on 4/16/2025), Disp: , Rfl:

## 2025-06-24 PROBLEM — N30.01 ACUTE CYSTITIS WITH HEMATURIA: Status: ACTIVE | Noted: 2025-06-24

## 2025-06-24 PROBLEM — R11.2 NAUSEA AND VOMITING: Status: ACTIVE | Noted: 2025-06-24

## 2025-06-24 PROBLEM — K52.9 GASTROENTERITIS: Status: ACTIVE | Noted: 2025-06-24

## 2025-06-24 PROBLEM — R51.9 ACUTE NONINTRACTABLE HEADACHE: Status: ACTIVE | Noted: 2025-06-24

## 2025-06-24 PROBLEM — R30.0 DYSURIA: Status: ACTIVE | Noted: 2025-06-24

## 2025-06-25 DIAGNOSIS — I27.20 PULMONARY HYPERTENSION: ICD-10-CM

## 2025-06-25 DIAGNOSIS — I48.3 TYPICAL ATRIAL FLUTTER: Chronic | ICD-10-CM

## 2025-06-25 RX ORDER — LOSARTAN POTASSIUM 25 MG/1
12.5 TABLET ORAL DAILY
Qty: 45 TABLET | Refills: 0 | Status: SHIPPED | OUTPATIENT
Start: 2025-06-25

## 2025-06-25 RX ORDER — METOPROLOL SUCCINATE 25 MG/1
25 TABLET, EXTENDED RELEASE ORAL DAILY
Qty: 90 TABLET | Refills: 0 | Status: SHIPPED | OUTPATIENT
Start: 2025-06-25

## 2025-06-25 RX ORDER — AMIODARONE HYDROCHLORIDE 200 MG/1
100 TABLET ORAL DAILY
Qty: 15 TABLET | Refills: 2 | Status: SHIPPED | OUTPATIENT
Start: 2025-06-25

## 2025-07-01 PROBLEM — N39.0 E. COLI UTI: Status: ACTIVE | Noted: 2025-07-01

## 2025-07-01 PROBLEM — Z79.01 LONG TERM (CURRENT) USE OF ANTICOAGULANTS: Status: RESOLVED | Noted: 2022-04-27 | Resolved: 2025-07-01

## 2025-07-01 PROBLEM — R11.2 NAUSEA AND VOMITING: Status: RESOLVED | Noted: 2025-06-24 | Resolved: 2025-07-01

## 2025-07-01 PROBLEM — N30.01 ACUTE CYSTITIS WITH HEMATURIA: Status: RESOLVED | Noted: 2025-06-24 | Resolved: 2025-07-01

## 2025-07-01 PROBLEM — Z98.890 HISTORY OF MAZE PROCEDURE: Status: RESOLVED | Noted: 2024-04-08 | Resolved: 2025-07-01

## 2025-07-01 PROBLEM — Z79.899 LONG TERM CURRENT USE OF AMIODARONE: Status: RESOLVED | Noted: 2024-10-16 | Resolved: 2025-07-01

## 2025-07-01 PROBLEM — K92.2 GI BLEED: Status: ACTIVE | Noted: 2025-07-01

## 2025-07-01 PROBLEM — Z71.89 ACP (ADVANCE CARE PLANNING): Status: ACTIVE | Noted: 2025-07-01

## 2025-07-01 PROBLEM — Z95.2 HISTORY OF MITRAL VALVE REPLACEMENT: Status: RESOLVED | Noted: 2024-04-08 | Resolved: 2025-07-01

## 2025-07-01 PROBLEM — E66.3 OVERWEIGHT (BMI 25.0-29.9): Status: RESOLVED | Noted: 2022-03-18 | Resolved: 2025-07-01

## 2025-07-01 PROBLEM — R53.83 OTHER FATIGUE: Status: RESOLVED | Noted: 2022-03-18 | Resolved: 2025-07-01

## 2025-07-01 PROBLEM — I65.23 CAROTID ARTERY PLAQUE, BILATERAL: Status: RESOLVED | Noted: 2023-11-22 | Resolved: 2025-07-01

## 2025-07-01 PROBLEM — R51.9 ACUTE NONINTRACTABLE HEADACHE: Status: RESOLVED | Noted: 2025-06-24 | Resolved: 2025-07-01

## 2025-07-01 PROBLEM — R06.02 SOB (SHORTNESS OF BREATH): Status: RESOLVED | Noted: 2022-03-18 | Resolved: 2025-07-01

## 2025-07-01 PROBLEM — R94.39 ABNORMAL NUCLEAR STRESS TEST: Status: RESOLVED | Noted: 2022-04-27 | Resolved: 2025-07-01

## 2025-07-01 PROBLEM — I34.0 MODERATE TO SEVERE MITRAL REGURGITATION: Status: RESOLVED | Noted: 2022-04-27 | Resolved: 2025-07-01

## 2025-07-01 PROBLEM — E78.00 HYPERCHOLESTEROLEMIA: Status: RESOLVED | Noted: 2022-03-18 | Resolved: 2025-07-01

## 2025-07-01 PROBLEM — R09.89 BRUIT OF LEFT CAROTID ARTERY: Status: RESOLVED | Noted: 2023-07-10 | Resolved: 2025-07-01

## 2025-07-01 PROBLEM — I50.1 LEFT HEART FAILURE: Status: RESOLVED | Noted: 2022-06-15 | Resolved: 2025-07-01

## 2025-07-01 PROBLEM — K52.9 GASTROENTERITIS: Status: RESOLVED | Noted: 2025-06-24 | Resolved: 2025-07-01

## 2025-07-01 PROBLEM — G47.39 OTHER SLEEP APNEA: Status: RESOLVED | Noted: 2022-10-19 | Resolved: 2025-07-01

## 2025-07-01 PROBLEM — I42.0 DILATED CARDIOMYOPATHY: Status: RESOLVED | Noted: 2022-04-27 | Resolved: 2025-07-01

## 2025-07-01 PROBLEM — B96.20 E. COLI UTI: Status: ACTIVE | Noted: 2025-07-01

## 2025-07-01 PROBLEM — R30.0 DYSURIA: Status: RESOLVED | Noted: 2025-06-24 | Resolved: 2025-07-01

## 2025-07-01 PROBLEM — I50.40 COMBINED SYSTOLIC AND DIASTOLIC CONGESTIVE HEART FAILURE: Status: ACTIVE | Noted: 2025-07-01

## 2025-07-01 PROBLEM — I48.91 ATRIAL FIBRILLATION WITH RVR: Status: RESOLVED | Noted: 2022-03-18 | Resolved: 2025-07-01

## 2025-07-02 PROBLEM — R07.9 CHEST PAIN: Status: ACTIVE | Noted: 2025-07-02

## 2025-07-02 PROBLEM — K22.11 ESOPHAGEAL ULCER WITH BLEEDING: Status: ACTIVE | Noted: 2025-07-01

## 2025-07-03 PROBLEM — K92.2 ACUTE GI BLEEDING: Status: ACTIVE | Noted: 2025-07-03

## 2025-07-04 PROBLEM — R54 AGE-RELATED PHYSICAL DEBILITY: Status: ACTIVE | Noted: 2025-07-04

## 2025-07-04 PROBLEM — Z73.6 LIMITATION OF ACTIVITY DUE TO DISABILITY: Status: ACTIVE | Noted: 2025-07-04

## 2025-07-05 DIAGNOSIS — I27.20 PULMONARY HYPERTENSION: ICD-10-CM

## 2025-07-05 DIAGNOSIS — I48.3 TYPICAL ATRIAL FLUTTER: Chronic | ICD-10-CM

## 2025-07-05 PROBLEM — I25.10 CAD (CORONARY ARTERY DISEASE): Status: ACTIVE | Noted: 2025-07-02

## 2025-07-07 PROBLEM — R53.1 WEAKNESS: Status: ACTIVE | Noted: 2025-07-04

## 2025-07-07 RX ORDER — METOPROLOL SUCCINATE 25 MG/1
25 TABLET, EXTENDED RELEASE ORAL
Qty: 90 TABLET | Refills: 0 | OUTPATIENT
Start: 2025-07-07

## 2025-07-07 RX ORDER — LOSARTAN POTASSIUM 25 MG/1
TABLET ORAL
Qty: 45 TABLET | Refills: 0 | OUTPATIENT
Start: 2025-07-07

## 2025-07-27 PROBLEM — I48.91 ATRIAL FIBRILLATION: Status: RESOLVED | Noted: 2024-04-15 | Resolved: 2025-07-27

## 2025-07-27 PROBLEM — I50.22 HEART FAILURE WITH MILDLY REDUCED EJECTION FRACTION (HFMREF): Status: ACTIVE | Noted: 2025-07-01

## 2025-07-28 ENCOUNTER — OFFICE VISIT (OUTPATIENT)
Dept: CARDIOLOGY | Facility: CLINIC | Age: 75
End: 2025-07-28
Payer: MEDICARE

## 2025-07-28 VITALS
HEART RATE: 70 BPM | SYSTOLIC BLOOD PRESSURE: 113 MMHG | HEIGHT: 66 IN | BODY MASS INDEX: 27.81 KG/M2 | WEIGHT: 173.06 LBS | DIASTOLIC BLOOD PRESSURE: 69 MMHG

## 2025-07-28 DIAGNOSIS — Z95.2 S/P MITRAL VALVE REPLACEMENT: Primary | ICD-10-CM

## 2025-07-28 DIAGNOSIS — I27.20 PULMONARY HYPERTENSION: ICD-10-CM

## 2025-07-28 DIAGNOSIS — I48.3 TYPICAL ATRIAL FLUTTER: ICD-10-CM

## 2025-07-28 DIAGNOSIS — I25.10 CORONARY ARTERY DISEASE, UNSPECIFIED VESSEL OR LESION TYPE, UNSPECIFIED WHETHER ANGINA PRESENT, UNSPECIFIED WHETHER NATIVE OR TRANSPLANTED HEART: ICD-10-CM

## 2025-07-28 DIAGNOSIS — I50.22 HEART FAILURE WITH MILDLY REDUCED EJECTION FRACTION (HFMREF): ICD-10-CM

## 2025-07-28 DIAGNOSIS — N18.31 STAGE 3A CHRONIC KIDNEY DISEASE: ICD-10-CM

## 2025-07-28 DIAGNOSIS — Z98.890 S/P LEFT ATRIAL APPENDAGE LIGATION: ICD-10-CM

## 2025-07-28 DIAGNOSIS — I48.0 PAF (PAROXYSMAL ATRIAL FIBRILLATION): ICD-10-CM

## 2025-07-28 PROCEDURE — 99213 OFFICE O/P EST LOW 20 MIN: CPT | Mod: PBBFAC,PO

## 2025-07-28 PROCEDURE — 99214 OFFICE O/P EST MOD 30 MIN: CPT | Mod: S$PBB,,,

## 2025-07-28 PROCEDURE — 99999 PR PBB SHADOW E&M-EST. PATIENT-LVL III: CPT | Mod: PBBFAC,,,

## 2025-07-28 NOTE — PROGRESS NOTES
Subjective:    Patient ID:  Veronica Monroy is a 75 y.o. female patient here for evaluation No chief complaint on file.    History of Present Illness:     Veronica Monroy is a 75 y.o. female who follows with Dr. Palma here today for hospital follow up. Last seen in clinic 4/2025.     Recently hospitalized for evaluation of N/V/D, tarry stools. Found to be profoundly anemic (Hgb 5.2), requiring 2 Units PRBCs. Underwent EGD, which revealed non-bleeding gastric ulcers s/p bipolar cautery. Ulcers likely NSAID etiology. Aspirin held during hospitalization and on discharge. Discharged in stable condition.     She denies CP, SOB/PROCTOR, palpitations, dizziness, fatigue, activity intolerance. Feels much better since discharge.     Focused Active Problem List includes:  NSAID-induced GIB (aspirin) - see HPI  S/P MVR + CAIO ligation (4/2024)  Completed 90-day warfarin course  Paroxysmal AF/AFL   Chronic HFmrEF   Pulmonary hypertension   Nonobstructive CAD  CKD3a      Most Recent Echocardiogram Results  Results for orders placed during the hospital encounter of 05/30/24    Echo Saline Bubble? No; Ultrasound enhancing contrast? Yes    Interpretation Summary    Left Ventricle: The left ventricle is normal in size.  Septal motion is consistent with post-operative status. There is mildly reduced systolic function. Ejection fraction by visual approximation is 45 -50 %.    Right Ventricle: Normal right ventricular cavity size. Systolic function is normal.    Left Atrium: Left atrium is  moderately to severely dilated.    Mitral Valve: There is a bioprosthetic valve in the mitral position that is well-seated.    Tricuspid Valve: There is moderate regurgitation.    Pulmonary Artery: The estimated pulmonary artery systolic pressure is 38 mmHg.    IVC/SVC: Intermediate venous pressure at 8 mmHg.    No pericardial effusion.      Most Recent Nuclear Stress Test Results  Results for orders placed during the hospital encounter of  03/28/22    Nuclear Stress - Cardiology Interpreted    Interpretation Summary    Abnormal myocardial perfusion scan.    There is a small to moderate sized, fixed perfusion abnormality consistent with scar in the inferobasilar wall(s).    The gated perfusion images showed an ejection fraction of 33% at rest. The gated perfusion images showed an ejection fraction of 36% post stress.    The EKG portion of this study is negative for ischemia.    During stress, rare PVCs are noted.      Most Recent Cardiac PET Stress Test Results  No results found for this or any previous visit.      Most Recent Cardiovascular Angiogram results  Results for orders placed during the hospital encounter of 04/08/24    Cardiac catheterization (Needs Review)  This result has not been signed. Information might be incomplete.    Conclusion    The estimated blood loss was none.    Uncomplicated intraoperative GEORGE.  Report to follow.    The procedure log was documented by No documenter listed and verified by Ortega Coates MD.    Date: 4/11/2024  Time: 8:47 AM      Other Most Recent Cardiology Results  Results for orders placed during the hospital encounter of 07/01/25    Cardiac monitoring strips      REVIEW OF SYSTEMS: As noted in HPI   CARDIOVASCULAR: No recent chest pain, palpitations, arm/neck/jaw pain, or edema.  RESPIRATORY: No recent fever, cough, SOB.  : No blood in the urine  GI: No reflux, nausea, vomiting, or blood in stool.   MUSCULOSKELETAL: No falls.   NEURO: No headaches, syncope, or dizziness.  EYES: No sudden changes in vision.     Past Medical History:   Diagnosis Date    A-fib     Anticoagulant long-term use     Arthritis     CHF (congestive heart failure)     CKD stage 3a, GFR 45-59 ml/min     Hyperlipidemia     Mitral valve stenosis     Pulmonary hypertension      Past Surgical History:   Procedure Laterality Date    ABLATION AND RECONSTRUCTION, CARDIAC ATRIUM, EXTENSIVE, WITH CARDIOPULMONARY BYPASS N/A 4/8/2024     Procedure: ABLATION AND RECONSTRUCTION, CARDIAC ATRIUM, EXTENSIVE, WITH CARDIOPULMONARY BYPASS;  Surgeon: Laura Boyd MD;  Location: [facility] OR;  Service: Cardiothoracic;  Laterality: N/A;    APPENDECTOMY      ARTERIOGRAPHY OF AORTIC ROOT  02/20/2024    Procedure: AO Root;  Surgeon: Yon Palma MD;  Location: [facility] CATH;  Service: Cardiology;;    CATARACT Bilateral     CATHETERIZATION OF BOTH LEFT AND RIGHT HEART  02/20/2024    Procedure: Right / Left heart cath;  Surgeon: Yon Palma MD;  Location: [facility] CATH;  Service: Cardiology;;    COLONOSCOPY      CORONARY ANGIOGRAPHY N/A 05/16/2022    Procedure: ANGIOGRAM, CORONARY ARTERY;  Surgeon: Yon Palma MD;  Location: [facility] CATH;  Service: Cardiology;  Laterality: N/A;    CORONARY ANGIOGRAPHY N/A 02/20/2024    Procedure: ANGIOGRAM, CORONARY ARTERY;  Surgeon: Yno Palma MD;  Location: [facility] CATH;  Service: Cardiology;  Laterality: N/A;    ESOPHAGOGASTRODUODENOSCOPY N/A 7/2/2025    Procedure: EGD (ESOPHAGOGASTRODUODENOSCOPY);  Surgeon: Immanuel Ashley MD;  Location: [facility] ENDO;  Service: Endoscopy;  Laterality: N/A;    EXCLUSION, LEFT ATRIAL APPENDAGE, OPEN, AS PART OF OPEN CHEST SURGERY Left 4/8/2024    Procedure: EXCLUSION, LEFT ATRIAL APPENDAGE, OPEN, AS PART OF OPEN CHEST SURGERY;  Surgeon: Laura Boyd MD;  Location: [facility] OR;  Service: Cardiothoracic;  Laterality: Left;    HYSTERECTOMY      LEFT HEART CATHETERIZATION Left 05/16/2022    Procedure: Left heart cath;  Surgeon: Yon Palma MD;  Location: [facility] CATH;  Service: Cardiology;  Laterality: Left;    MITRAL VALVE REPLACEMENT N/A 4/8/2024    Procedure: REPLACEMENT, MITRAL VALVE;  Surgeon: Laura Boyd MD;  Location: [facility] OR;  Service: Cardiothoracic;  Laterality: N/A;    ROTATOR CUFF REPAIR Left 03/2023    TRANSESOPHAGEAL ECHOCARDIOGRAPHY N/A 4/8/2024    Procedure: ECHOCARDIOGRAM, TRANSESOPHAGEAL;  Surgeon: Laura Boyd MD;  Location: [facility] OR;  Service: Cardiothoracic;  Laterality: N/A;      Social History[1]      Objective    There were no vitals filed for this visit.    The ASCVD Risk score (Steven ALCAZAR, et al., 2019) failed to calculate for the following reasons:    The valid total cholesterol range is 130 to 320 mg/dL      LAST EKG  Results for orders placed or performed during the hospital encounter of 07/01/25   EKG 12-lead    Collection Time: 07/01/25  8:55 AM   Result Value Ref Range    QRS Duration 82 ms    [facility] QTC Calculation 465 ms    Narrative    Test Reason : R53.1,    Vent. Rate :  71 BPM     Atrial Rate :    BPM     P-R Int :    ms          QRS Dur :  82 ms      QT Int : 428 ms       P-R-T Axes :     86   7 degrees    QTcB Int : 465 ms    Atrial fibrillation  Low voltage QRS  Nonspecific ST and T wave abnormality  Abnormal ECG  When compared with ECG of 20-Jun-2024 10:31,  Junctional rhythm has replaced Sinus rhythm  Non-specific change in ST segment in Anterior leads  T wave inversion now evident in Inferior leads  Confirmed by Ortega Coates ([id number]) on 7/1/2025 12:09:19 PM    Referred By: AAAREFERRAL SELF           Confirmed By: Ortega Coates     LIPIDS - LAST 2   Lab Results   Component Value Date    CHOL 93 (L) 07/05/2025    CHOL 166 07/02/2023    HDL 41 07/05/2025    HDL 51 07/02/2023    LDLCALC 42.2 (L) 07/05/2025    LDLCALC 97 07/02/2023    TRIG 49 07/05/2025    TRIG 90 07/02/2023    CHOLHDL 44.1 07/05/2025    CHOLHDL 36.8 03/18/2022     CARDIAC PROFILE - LAST 2  Lab Results   Component Value Date     (H) 04/10/2025     (H) 08/27/2024    TROPONINI 0.232 (HH) 04/22/2024    TROPONINI 0.262 (HH) 04/22/2024      CBC - LAST 2  Lab Results   Component Value Date    WBC 3.07 (L) 07/07/2025    WBC 3.23 (L) 07/05/2025    HGB 9.1 (L) 07/07/2025    HGB 8.7 (L) 07/05/2025    HCT 27.6 (L) 07/07/2025    HCT 26.3 (L) 07/05/2025     07/07/2025     (L) 07/05/2025     Lab Results   Component Value Date    LABPT 28.9 (H) 04/22/2024    LABPT 29.3 (H) 04/22/2024    INR 2.9  07/08/2024    INR 3.1 06/24/2024    APTT 45.6 (H) 04/22/2024    APTT 34.7 04/08/2024     CHEMISTRY - LAST 2  Lab Results   Component Value Date     07/07/2025     07/05/2025    K 3.9 07/07/2025    K 4.0 07/05/2025    CO2 28 07/07/2025    CO2 25 07/05/2025    BUN 15 07/07/2025    BUN 10 07/05/2025    CREATININE 0.94 07/07/2025    CREATININE 0.94 07/05/2025    GLU 93 07/07/2025    GLU 94 07/05/2025    CALCIUM 8.4 (L) 07/07/2025    CALCIUM 8.1 (L) 07/05/2025    PH 7.33 (L) 04/09/2024    PH 7.37 04/08/2024    MG 1.7 07/05/2025    MG 1.7 07/04/2025    ALBUMIN 3.3 (L) 07/07/2025    ALBUMIN 3.1 (L) 07/05/2025    ALT 7 (L) 07/07/2025    ALT <7 (L) 07/05/2025    AST 17 07/07/2025    AST 11 07/05/2025      ENDOCRINE - LAST 2  Lab Results   Component Value Date    HGBA1C 5.1 04/05/2024    HGBA1C 5.2 07/02/2023    TSH 4.720 (H) 07/04/2025    TSH 2.933 04/10/2025        PHYSICAL EXAM  CONSTITUTIONAL: Well built, well nourished in no apparent distress  NECK: no carotid bruit, no JVD  LUNGS: CTA  CHEST WALL: no tenderness  HEART: regular rate and rhythm, S1, S2 normal, no murmur, click, rub or gallop   ABDOMEN: soft, non-tender; bowel sounds normal; no masses,  no organomegaly  EXTREMITIES: Extremities normal, no edema, no calf tenderness noted  NEURO: AAO X 3    I HAVE REVIEWED :    The vital signs, most recent cardiac testing, and most recent pertinent non-cardiology provider notes.    Current Outpatient Medications   Medication Instructions    amiodarone (PACERONE) 100 mg, Oral, Daily    aspirin (ECOTRIN) 81 mg, Daily    cyanocobalamin (VITAMIN B-12) 1,000 mcg, Oral, Daily    metoprolol succinate (TOPROL-XL) 12.5 mg, Oral, Daily    pantoprazole (PROTONIX) 40 mg, Oral, Daily        Assessment   1. S/P mitral valve replacement (Primary)      2. S/P left atrial appendage ligation      3. PAF (paroxysmal atrial fibrillation)      4. Typical atrial flutter      5. Heart failure with mildly reduced ejection fraction  (HFmrEF)      6. Coronary artery disease, unspecified vessel or lesion type, unspecified whether angina present, unspecified whether native or transplanted heart      7. Pulmonary hypertension      8. Stage 3a chronic kidney disease         Plan to address above diagnoses:     -Stop aspirin -> no PCI/TIA/CVA history     Continue amiodarone 100 mg daily  Continue metoprolol succinate 12.5 mg daily  Continue Protonix       I emphasized the importance of modifying lifestyle related risk factors including tobacco avoidance, limiting alcohol intake, aerobic exercise, weight management and Mediterranean diet.      Follow up in about 6 months (around 2026).     Ivan Gerber NP  [facility]   Office: [phone]       [1]   Social History  Tobacco Use    Smoking status: Former     Current packs/day: 0.00     Types: Cigarettes     Start date: 3/18/2010     Quit date: 3/18/2016     Years since quittin.3    Smokeless tobacco: Never   Substance Use Topics    Alcohol use: Not Currently    Drug use: Not Currently     Types: Marijuana     Comment: cbd oil, quit

## 2025-08-02 DIAGNOSIS — I27.20 PULMONARY HYPERTENSION: ICD-10-CM

## 2025-08-02 DIAGNOSIS — I48.3 TYPICAL ATRIAL FLUTTER: Chronic | ICD-10-CM

## 2025-08-04 ENCOUNTER — TELEPHONE (OUTPATIENT)
Dept: CARDIOLOGY | Facility: CLINIC | Age: 75
End: 2025-08-04
Payer: MEDICARE

## 2025-08-04 RX ORDER — AMIODARONE HYDROCHLORIDE 200 MG/1
100 TABLET ORAL DAILY
Qty: 45 TABLET | Refills: 0 | Status: SHIPPED | OUTPATIENT
Start: 2025-08-04

## 2025-08-04 NOTE — TELEPHONE ENCOUNTER
Copied from CRM #[id number]. Topic: Medications - Medication Refill  >> Aug 4, 2025  9:22 AM Rita wrote:  Type:  RX Refill Request    Who Called:  pt   Refill or New Rx: refill  RX Name and Strength: amiodarone (PACERONE) 200 MG Tab  How is the patient currently taking it? (ex. 1XDay): 2 x days  Is this a 30 day or 90 day RX:   Preferred Pharmacy with phone number:  [facility] [id number] [address]  Phone: [phone] Fax: [phone]  Local or Mail Order: local  Ordering Provider: Yon Palma  Would the patient rather a call back or a response via [facility]? call  Best Call Back Number:Telephone Information:  Mobile          [phone]      Additional Information:  pt states she is having trouble getting this med refill please call to advise